# Patient Record
Sex: FEMALE | Race: WHITE | HISPANIC OR LATINO | Employment: OTHER | URBAN - NONMETROPOLITAN AREA
[De-identification: names, ages, dates, MRNs, and addresses within clinical notes are randomized per-mention and may not be internally consistent; named-entity substitution may affect disease eponyms.]

---

## 2017-02-08 ENCOUNTER — OFFICE VISIT (OUTPATIENT)
Dept: FAMILY MEDICINE | Facility: OTHER | Age: 57
End: 2017-02-08
Attending: PHYSICIAN ASSISTANT
Payer: COMMERCIAL

## 2017-02-08 VITALS
HEART RATE: 69 BPM | WEIGHT: 160 LBS | TEMPERATURE: 97.8 F | OXYGEN SATURATION: 98 % | DIASTOLIC BLOOD PRESSURE: 82 MMHG | BODY MASS INDEX: 29.44 KG/M2 | SYSTOLIC BLOOD PRESSURE: 126 MMHG | HEIGHT: 62 IN

## 2017-02-08 DIAGNOSIS — R74.8 ELEVATED AMYLASE: ICD-10-CM

## 2017-02-08 DIAGNOSIS — Z12.31 ENCOUNTER FOR SCREENING MAMMOGRAM FOR BREAST CANCER: ICD-10-CM

## 2017-02-08 DIAGNOSIS — Z90.49 H/O COLECTOMY: ICD-10-CM

## 2017-02-08 DIAGNOSIS — Z76.89 ESTABLISHING CARE WITH NEW DOCTOR, ENCOUNTER FOR: Primary | ICD-10-CM

## 2017-02-08 DIAGNOSIS — R10.13 ABDOMINAL PAIN, EPIGASTRIC: ICD-10-CM

## 2017-02-08 PROBLEM — K57.30 DIVERTICULOSIS OF LARGE INTESTINE: Status: ACTIVE | Noted: 2017-02-08

## 2017-02-08 LAB
ALBUMIN SERPL-MCNC: 3.9 G/DL (ref 3.4–5)
ALP SERPL-CCNC: 104 U/L (ref 40–150)
ALT SERPL W P-5'-P-CCNC: 44 U/L (ref 0–50)
AMYLASE SERPL-CCNC: 133 U/L (ref 30–110)
ANION GAP SERPL CALCULATED.3IONS-SCNC: 5 MMOL/L (ref 3–14)
AST SERPL W P-5'-P-CCNC: 27 U/L (ref 0–45)
BASOPHILS # BLD AUTO: 0 10E9/L (ref 0–0.2)
BASOPHILS NFR BLD AUTO: 0.4 %
BILIRUB SERPL-MCNC: 1 MG/DL (ref 0.2–1.3)
BUN SERPL-MCNC: 8 MG/DL (ref 7–30)
CALCIUM SERPL-MCNC: 9 MG/DL (ref 8.5–10.1)
CHLORIDE SERPL-SCNC: 108 MMOL/L (ref 94–109)
CO2 SERPL-SCNC: 28 MMOL/L (ref 20–32)
CREAT SERPL-MCNC: 0.73 MG/DL (ref 0.52–1.04)
CRP SERPL-MCNC: 4.6 MG/L (ref 0–8)
DIFFERENTIAL METHOD BLD: ABNORMAL
EOSINOPHIL # BLD AUTO: 0.1 10E9/L (ref 0–0.7)
EOSINOPHIL NFR BLD AUTO: 0.9 %
ERYTHROCYTE [DISTWIDTH] IN BLOOD BY AUTOMATED COUNT: 12.6 % (ref 10–15)
ERYTHROCYTE [SEDIMENTATION RATE] IN BLOOD BY WESTERGREN METHOD: 9 MM/H (ref 0–30)
GFR SERPL CREATININE-BSD FRML MDRD: 83 ML/MIN/1.7M2
GLUCOSE SERPL-MCNC: 107 MG/DL (ref 70–99)
HCT VFR BLD AUTO: 45.5 % (ref 35–47)
HGB BLD-MCNC: 15.8 G/DL (ref 11.7–15.7)
IMM GRANULOCYTES # BLD: 0 10E9/L (ref 0–0.4)
IMM GRANULOCYTES NFR BLD: 0.3 %
LIPASE SERPL-CCNC: 373 U/L (ref 73–393)
LYMPHOCYTES # BLD AUTO: 2.6 10E9/L (ref 0.8–5.3)
LYMPHOCYTES NFR BLD AUTO: 33.6 %
MCH RBC QN AUTO: 31.3 PG (ref 26.5–33)
MCHC RBC AUTO-ENTMCNC: 34.7 G/DL (ref 31.5–36.5)
MCV RBC AUTO: 90 FL (ref 78–100)
MONOCYTES # BLD AUTO: 0.6 10E9/L (ref 0–1.3)
MONOCYTES NFR BLD AUTO: 8 %
NEUTROPHILS # BLD AUTO: 4.4 10E9/L (ref 1.6–8.3)
NEUTROPHILS NFR BLD AUTO: 56.8 %
NRBC # BLD AUTO: 0 10*3/UL
NRBC BLD AUTO-RTO: 0 /100
PLATELET # BLD AUTO: 218 10E9/L (ref 150–450)
POTASSIUM SERPL-SCNC: 3.9 MMOL/L (ref 3.4–5.3)
PROT SERPL-MCNC: 7.9 G/DL (ref 6.8–8.8)
RBC # BLD AUTO: 5.04 10E12/L (ref 3.8–5.2)
SODIUM SERPL-SCNC: 141 MMOL/L (ref 133–144)
WBC # BLD AUTO: 7.8 10E9/L (ref 4–11)

## 2017-02-08 PROCEDURE — 86140 C-REACTIVE PROTEIN: CPT | Performed by: PHYSICIAN ASSISTANT

## 2017-02-08 PROCEDURE — 82150 ASSAY OF AMYLASE: CPT | Performed by: PHYSICIAN ASSISTANT

## 2017-02-08 PROCEDURE — 85652 RBC SED RATE AUTOMATED: CPT | Performed by: PHYSICIAN ASSISTANT

## 2017-02-08 PROCEDURE — 83690 ASSAY OF LIPASE: CPT | Performed by: PHYSICIAN ASSISTANT

## 2017-02-08 PROCEDURE — 99203 OFFICE O/P NEW LOW 30 MIN: CPT | Performed by: PHYSICIAN ASSISTANT

## 2017-02-08 PROCEDURE — 80053 COMPREHEN METABOLIC PANEL: CPT | Performed by: PHYSICIAN ASSISTANT

## 2017-02-08 PROCEDURE — 36415 COLL VENOUS BLD VENIPUNCTURE: CPT | Performed by: PHYSICIAN ASSISTANT

## 2017-02-08 PROCEDURE — 85025 COMPLETE CBC W/AUTO DIFF WBC: CPT | Performed by: PHYSICIAN ASSISTANT

## 2017-02-08 RX ORDER — HYOSCYAMINE SULFATE 0.125 MG
0.125-0.25 TABLET ORAL EVERY 4 HOURS PRN
Qty: 40 TABLET | Refills: 1 | Status: SHIPPED | OUTPATIENT
Start: 2017-02-08 | End: 2018-09-11

## 2017-02-08 RX ORDER — GRAPE SEED OIL 100 %
1 OIL (ML) MISCELLANEOUS DAILY
COMMUNITY
End: 2021-06-21

## 2017-02-08 ASSESSMENT — ANXIETY QUESTIONNAIRES
1. FEELING NERVOUS, ANXIOUS, OR ON EDGE: SEVERAL DAYS
5. BEING SO RESTLESS THAT IT IS HARD TO SIT STILL: NOT AT ALL
IF YOU CHECKED OFF ANY PROBLEMS ON THIS QUESTIONNAIRE, HOW DIFFICULT HAVE THESE PROBLEMS MADE IT FOR YOU TO DO YOUR WORK, TAKE CARE OF THINGS AT HOME, OR GET ALONG WITH OTHER PEOPLE: NOT DIFFICULT AT ALL
6. BECOMING EASILY ANNOYED OR IRRITABLE: SEVERAL DAYS
2. NOT BEING ABLE TO STOP OR CONTROL WORRYING: SEVERAL DAYS
3. WORRYING TOO MUCH ABOUT DIFFERENT THINGS: SEVERAL DAYS
7. FEELING AFRAID AS IF SOMETHING AWFUL MIGHT HAPPEN: SEVERAL DAYS
GAD7 TOTAL SCORE: 5

## 2017-02-08 ASSESSMENT — PATIENT HEALTH QUESTIONNAIRE - PHQ9: 5. POOR APPETITE OR OVEREATING: NOT AT ALL

## 2017-02-08 ASSESSMENT — PAIN SCALES - GENERAL: PAINLEVEL: NO PAIN (0)

## 2017-02-08 NOTE — PATIENT INSTRUCTIONS
Thank you for choosing [facility].   I appreciate the opportunity to serve you and look forward to supporting your healthcare needs in the future. Please contact me with any questions or concerns that you may have.    Sincerely,    Celeste Villa RN, PA-C

## 2017-02-08 NOTE — MR AVS SNAPSHOT
After Visit Summary   2/8/2017    Annamaria Baer    MRN: [id number]           Patient Information     Date Of Birth          1960        Visit Information        Provider Department      2/8/2017 9:00 AM Celeste Villa PA [facility]        Today's Diagnoses     Establishing care with new doctor, encounter for    -  1     Abdominal pain, epigastric         H/O colectomy         Encounter for screening mammogram for breast cancer           Care Instructions    Thank you for choosing [facility].   I appreciate the opportunity to serve you and look forward to supporting your healthcare needs in the future. Please contact me with any questions or concerns that you may have.    Sincerely,    Celeste Villa RN, PA-C           Follow-ups after your visit        Additional Services     GASTROENTEROLOGY ADULT REF CONSULT ONLY       Preferred Location: Dr. Franco or Dr. Eli.  Willing to go to [facility].  We need to get records on her 2 colon resections to him.       Please be aware that coverage of these services is subject to the terms and limitations of your health insurance plan.  Call member services at your health plan with any benefit or coverage questions.  Any procedures must be performed at a [place] facility OR coordinated by your clinic's referral office.    Please bring the following with you to your appointment:    (1) Any X-Rays, CTs or MRIs which have been performed.  Contact the facility where they were done to arrange for  prior to your scheduled appointment.    (2) List of current medications   (3) This referral request   (4) Any documents/labs given to you for this referral                  Who to contact     If you have questions or need follow up information about today's clinic visit or your schedule please contact [facility] directly at [phone].  Normal or non-critical lab and imaging results will be communicated to you by  "MyChart, letter or phone within 4 business days after the clinic has received the results. If you do not hear from us within 7 days, please contact the clinic through [employer] or phone. If you have a critical or abnormal lab result, we will notify you by phone as soon as possible.  Submit refill requests through [employer] or call your pharmacy and they will forward the refill request to us. Please allow 3 business days for your refill to be completed.          Additional Information About Your Visit        [employer]" Information     [employer] lets you send messages to your doctor, view your test results, renew your prescriptions, schedule appointments and more. To sign up, go to [email or web address] . Click on \"Log in\" on the left side of the screen, which will take you to the Welcome page. Then click on \"Sign up Now\" on the right side of the page.     You will be asked to enter the access code listed below, as well as some personal information. Please follow the directions to create your username and password.     Your access code is: [email or web address]  Expires: 2017  9:44 AM     Your access code will  in 90 days. If you need help or a new code, please call your [place] clinic or [phone].        Care EveryWhere ID     This is your Care EveryWhere ID. This could be used by other organizations to access your [place] medical records  [id number]        Your Vitals Were     Pulse Temperature Height BMI (Body Mass Index) Pulse Oximetry       69 97.8  F (36.6  C) (Tympanic) 5' 2\" (1.575 m) 29.26 kg/m2 98%        Blood Pressure from Last 3 Encounters:   17 126/82    Weight from Last 3 Encounters:   17 160 lb (72.576 kg)              We Performed the Following     Amylase     CBC with platelets and differential     Comprehensive metabolic panel     CRP, inflammation     ESR: Erythrocyte sedimentation rate     GASTROENTEROLOGY ADULT REF CONSULT ONLY     Lipase     MA Screening Digital Bilateral        "   Today's Medication Changes          These changes are accurate as of: 2/8/17  9:44 AM.  If you have any questions, ask your nurse or doctor.               Start taking these medicines.        Dose/Directions    hyoscyamine 0.125 MG tablet   Commonly known as:  ANASPAZ/LEVSIN   Used for:  Abdominal pain, epigastric, H/O colectomy   Started by:  Celeste Villa PA        Dose:  0.125-0.25 mg   Take 1-2 tablets (125-250 mcg) by mouth every 4 hours as needed for cramping   Quantity:  40 tablet   Refills:  1            Where to get your medicines      These medications were sent to [facility] - [facility] - [address]  [address] [place] [address]     Phone:  [phone]    - hyoscyamine 0.125 MG tablet             Primary Care Provider    None Specified       No primary provider on file.        Thank you!     [facility] you [facility] [place]  [facility] your [facility]. Our goal is always to provide you with excellent care. Hearing back from our patients is one way we can continue to improve our services. Please take a few minutes to complete the written survey that you may receive in the mail after your visit with us. Thank you!             Your Updated Medication List - Protect others around you: Learn how to safely use, store and throw away your medicines at www.disposemymeds.org.          This list is accurate as of: 2/8/17  9:44 AM.  Always use your most recent med list.                   Brand Name Dispense Instructions for use    FIBER PO      Take 6 tablets by mouth daily       Grape Seed Oil      1 capsule daily       hyoscyamine 0.125 MG tablet    ANASPAZ/LEVSIN    40 tablet    Take 1-2 tablets (125-250 mcg) by mouth every 4 hours as needed for cramping       IBUPROFEN -38 MG Tabs   Generic drug:  Ibuprofen-Diphenhydramine Cit      Take 1 tablet by mouth nightly as needed       METAMUCIL 0.52 G capsule   Generic drug:  psyllium          minoxidil 2 % external solution     ROGAINE         * UNABLE TO FIND      MEDICATION NAME: Consuelo REYES       * UNABLE TO FIND      MEDICATION NAME: Tumeric       * Notice:  This list has 2 medication(s) that are the same as other medications prescribed for you. Read the directions carefully, and ask your doctor or other care provider to review them with you.

## 2017-02-08 NOTE — NURSING NOTE
"Chief Complaint   Patient presents with     Establish Care     Need PCP     GI Problem     *_* Health Care Directive *_*     declined        Initial /82 mmHg  Pulse 69  Temp(Src) 97.8  F (36.6  C) (Tympanic)  Ht 5' 2\" (1.575 m)  Wt 160 lb (72.576 kg)  BMI 29.26 kg/m2  SpO2 98% Estimated body mass index is 29.26 kg/(m^2) as calculated from the following:    Height as of this encounter: 5' 2\" (1.575 m).    Weight as of this encounter: 160 lb (72.576 kg).  Medication Reconciliation: complete     EARNESTINE MOYA      "

## 2017-02-08 NOTE — PROGRESS NOTES
SUBJECTIVE:                                                    Annamaria Baer is a 56 year old female who presents to clinic today for the following health issues:      Gastrointestinal symptoms    Duration: 3 years  Description:          History of 2 colon resections also has diverticulitis  Intensity:  moderate  Accompanying signs and symptoms:  nausea, diarrhea, problems swallowing -  and abdominal pain  History  Previous {similar problem: YES  Previous evaluation:  colonoscopy and Colon resection  Aggravating factors: unknown   Alleviating factors: nothing   Other Therapies tried: metamucil. See Dr[facility]ar Colin [facility]    Had a pap smear 2 years.  Not had mammogram in 2 years.          Problem list and histories reviewed & adjusted, as indicated.  Additional history: as documented    Patient Active Problem List   Diagnosis     History of colectomy     Enthesopathy of hip region     Diverticulosis of large intestine     Past Surgical History   Procedure Laterality Date     Resection abdominal perineal       Colonoscopy       Gallbladder surgery        section       X2     Ankle surgery Left        Social History   Substance Use Topics     Smoking status: Former Smoker     Smokeless tobacco: Not on file     Alcohol Use: Not on file     Family History   Problem Relation Age of Onset     CEREBROVASCULAR DISEASE Mother      Cirrhosis Father      Bone Cancer Maternal Grandmother      DIABETES Paternal Grandmother      CANCER Paternal Grandfather          Current Outpatient Prescriptions   Medication Sig Dispense Refill     minoxidil (ROGAINE) 2 % external solution        psyllium (METAMUCIL) 0.52 G capsule        FIBER PO Take 6 tablets by mouth daily       UNABLE TO FIND MEDICATION NAME: Slippery ELM       UNABLE TO FIND MEDICATION NAME: Tumeric       Grape Seed OIL 1 capsule daily       Ibuprofen-Diphenhydramine Cit (IBUPROFEN PM) 200-38 MG TABS Take 1 tablet by mouth nightly  "as needed       No Known Allergies  BP Readings from Last 3 Encounters:   02/08/17 126/82    Wt Readings from Last 3 Encounters:   02/08/17 160 lb (72.576 kg)                  Problem list, Medication list, Allergies, and Medical/Social/Surgical histories reviewed in [facility] and updated as appropriate.    ROS:  Constitutional, neuro, ENT, endocrine, pulmonary, cardiac, gastrointestinal, genitourinary, musculoskeletal, integument and psychiatric systems are negative, except as otherwise noted.    OBJECTIVE:                                                    /82 mmHg  Pulse 69  Temp(Src) 97.8  F (36.6  C) (Tympanic)  Ht 5' 2\" (1.575 m)  Wt 160 lb (72.576 kg)  BMI 29.26 kg/m2  SpO2 98%  Body mass index is 29.26 kg/(m^2).  GENERAL APPEARANCE: healthy, alert and no distress  EYES: Eyes grossly normal to inspection, PERRL and conjunctivae and sclerae normal  HENT: ear canals and TM's normal and nose and mouth without ulcers or lesions  NECK: no adenopathy, no asymmetry, masses, or scars and thyroid normal to palpation  RESP: lungs clear to auscultation - no rales, rhonchi or wheezes  CV: regular rates and rhythm, normal S1 S2, no S3 or S4 and no murmur, click or rub  LYMPHATICS: normal ant/post cervical and supraclavicular nodes  ABDOMEN: scars appreciated.   Tenderness and pain,   Bloating.   MS: extremities normal- no gross deformities noted  SKIN: no suspicious lesions or rashes  NEURO: Normal strength and tone, mentation intact and speech normal  PSYCH: mentation appears normal and affect normal/bright    Diagnostic test results:  Diagnostic Test Results:  none      ASSESSMENT/PLAN:                                                    1. Establishing care with new doctor, encounter for  We will accept her in our care.        2. Abdominal pain, epigastric  Long standing history on this.  Has had 2 colon resections.   Has burning pain in her epigastrium.  Starts on the right and goes across the abdomen.   It is " debilitating.  She has done an excellent job at dietary an life changes.  We now need to send her on to Dr. Franco and Dr. Eli.   - hyoscyamine (ANASPAZ/LEVSIN) 0.125 MG tablet; Take 1-2 tablets (125-250 mcg) by mouth every 4 hours as needed for cramping  Dispense: 40 tablet; Refill: 1  - GASTROENTEROLOGY ADULT REF CONSULT ONLY  - CBC with platelets and differential  - Comprehensive metabolic panel  - ESR: Erythrocyte sedimentation rate  - CRP, inflammation  - Amylase  - Lipase    3. H/O colectomy  Had these done Dr. Bill.  He said ? Sphincter of Shantanu dysfunction.  We will get records.  Today it shows her Amylase is high and had gallbladder removed.  We are going to send her to see Gastro.  She has been miserable over 3 yeas.  Never had a problem after gallbladder was out only after her second colectomy.   She has modified her diet greatly and very low fat and low carb and gluten. We are going to check and ultrasound before sending her.   - hyoscyamine (ANASPAZ/LEVSIN) 0.125 MG tablet; Take 1-2 tablets (125-250 mcg) by mouth every 4 hours as needed for cramping  Dispense: 40 tablet; Refill: 1  - GASTROENTEROLOGY ADULT REF CONSULT ONLY  - CBC with platelets and differential  - Comprehensive metabolic panel  - ESR: Erythrocyte sedimentation rate  - CRP, inflammation  - Amylase  - Lipase    4. Encounter for screening mammogram for breast cancer  needing this done. Pap smear is up to date.   - MA Screening Digital Bilateral; Future  - MA Screening Digital Bilateral        See Patient Instructions    JOLENE Turcios  [facility]

## 2017-02-09 ASSESSMENT — PATIENT HEALTH QUESTIONNAIRE - PHQ9: SUM OF ALL RESPONSES TO PHQ QUESTIONS 1-9: 4

## 2017-02-09 ASSESSMENT — ANXIETY QUESTIONNAIRES: GAD7 TOTAL SCORE: 5

## 2017-02-16 ENCOUNTER — HOSPITAL ENCOUNTER (OUTPATIENT)
Dept: ULTRASOUND IMAGING | Facility: HOSPITAL | Age: 57
Discharge: HOME | End: 2017-02-16
Attending: PHYSICIAN ASSISTANT | Admitting: PHYSICIAN ASSISTANT
Payer: COMMERCIAL

## 2017-02-16 DIAGNOSIS — Z12.31 ENCOUNTER FOR SCREENING MAMMOGRAM FOR BREAST CANCER: ICD-10-CM

## 2017-02-16 DIAGNOSIS — Z12.31 VISIT FOR SCREENING MAMMOGRAM: Primary | ICD-10-CM

## 2017-02-16 PROCEDURE — G0202 SCR MAMMO BI INCL CAD: HCPCS | Mod: TC | Performed by: RADIOLOGY

## 2017-02-16 PROCEDURE — 77063 BREAST TOMOSYNTHESIS BI: CPT | Mod: TC | Performed by: RADIOLOGY

## 2017-02-16 PROCEDURE — 76705 ECHO EXAM OF ABDOMEN: CPT | Mod: TC

## 2017-02-16 PROCEDURE — 76700 US EXAM ABDOM COMPLETE: CPT | Mod: TC

## 2018-01-07 ENCOUNTER — HEALTH MAINTENANCE LETTER (OUTPATIENT)
Age: 58
End: 2018-01-07

## 2018-08-01 ENCOUNTER — OFFICE VISIT (OUTPATIENT)
Dept: FAMILY MEDICINE | Facility: OTHER | Age: 58
End: 2018-08-01
Attending: NURSE PRACTITIONER
Payer: COMMERCIAL

## 2018-08-01 VITALS
DIASTOLIC BLOOD PRESSURE: 72 MMHG | TEMPERATURE: 98.5 F | WEIGHT: 166 LBS | HEART RATE: 70 BPM | BODY MASS INDEX: 30.55 KG/M2 | RESPIRATION RATE: 18 BRPM | OXYGEN SATURATION: 98 % | HEIGHT: 62 IN | SYSTOLIC BLOOD PRESSURE: 126 MMHG

## 2018-08-01 DIAGNOSIS — Z87.898 H/O MOTION SICKNESS: Primary | ICD-10-CM

## 2018-08-01 PROCEDURE — 99212 OFFICE O/P EST SF 10 MIN: CPT | Performed by: NURSE PRACTITIONER

## 2018-08-01 RX ORDER — SCOLOPAMINE TRANSDERMAL SYSTEM 1 MG/1
PATCH, EXTENDED RELEASE TRANSDERMAL
Qty: 3 PATCH | Refills: 0 | Status: SHIPPED | OUTPATIENT
Start: 2018-08-01 | End: 2018-09-11

## 2018-08-01 ASSESSMENT — ANXIETY QUESTIONNAIRES
GAD7 TOTAL SCORE: 2
2. NOT BEING ABLE TO STOP OR CONTROL WORRYING: SEVERAL DAYS
5. BEING SO RESTLESS THAT IT IS HARD TO SIT STILL: NOT AT ALL
1. FEELING NERVOUS, ANXIOUS, OR ON EDGE: NOT AT ALL
6. BECOMING EASILY ANNOYED OR IRRITABLE: SEVERAL DAYS
4. TROUBLE RELAXING: NOT AT ALL
3. WORRYING TOO MUCH ABOUT DIFFERENT THINGS: NOT AT ALL
7. FEELING AFRAID AS IF SOMETHING AWFUL MIGHT HAPPEN: NOT AT ALL

## 2018-08-01 ASSESSMENT — PAIN SCALES - GENERAL: PAINLEVEL: NO PAIN (0)

## 2018-08-01 NOTE — PATIENT INSTRUCTIONS
Patient Education    Scopolamine Hydrobromide Ophthalmic drops, solution    Scopolamine Hydrobromide Oral tablet    Scopolamine Transdermal patch - 72 hour  Scopolamine Transdermal patch - 72 hour  What is this medicine?  SCOPOLAMINE (skoe BLOSSOM a meen) is used to prevent nausea and vomiting caused by motion sickness, anesthesia and surgery.  This medicine may be used for other purposes; ask your health care provider or pharmacist if you have questions.  What should I tell my health care provider before I take this medicine?  They need to know if you have any of these conditions:    glaucoma    kidney or liver disease    an unusual or allergic reaction (especially skin allergy) to scopolamine, atropine, other medicines, foods, dyes, or preservatives    pregnant or trying to get pregnant    breast-feeding  How should I use this medicine?  This medicine is for external use only. Follow the directions on the prescription label. One patch contains enough medicine to prevent motion sickness for up to 3 days. Apply the patch at least 4 hours before you need it and only wear one disc at a time. Choose an area behind the ear, that is clean, dry, hairless and free from any cuts or irritation. Wipe the area with a clean dry tissue. Peel off the plastic backing of the skin patch, trying not to touch the adhesive side with your hands. Do not cut the patches. Firmly apply to the area you have chosen, with the metallic side of the patch to the skin and the tan-colored side showing. Once firmly in place, wash your hands well with soap and water. Remove the disc after 3 days, or sooner if you no longer need it. After removing the patch, wash your hands and the area behind your ear thoroughly with soap and water. The patch will still contain some medicine after use. To avoid accidental contact or ingestion by children or pets, fold the used patch in half with the sticky side together and throw away in the trash out of the reach of  children and pets. If you need to use a second patch after you remove the first, place it behind the other ear.  Talk to your pediatrician regarding the use of this medicine in children. Special care may be needed.  Overdosage: If you think you have taken too much of this medicine contact a poison control center or emergency room at once.  NOTE: This medicine is only for you. Do not share this medicine with others.  What if I miss a dose?  Make sure you apply the patch at least 4 hours before you need it. You can apply it the night before traveling.  What may interact with this medicine?    benztropine    bethanechol    medicines for anxiety or sleeping problems like diazepam or temazepam    medicines for hay fever and other allergies    medicines for mental depression    muscle relaxants  This list may not describe all possible interactions. Give your health care provider a list of all the medicines, herbs, non-prescription drugs, or dietary supplements you use. Also tell them if you smoke, drink alcohol, or use illegal drugs. Some items may interact with your medicine.  What should I watch for while using this medicine?  Keep the patch dry, if possible, to prevent it from falling off. Limited contact with water, however, as in bathing or swimming, will not affect the system. If the patch falls off, throw it away and put a new one behind the other ear.  You may get drowsy or dizzy. Do not drive, use machinery, or do anything that needs mental alertness until you know how this medicine affects you. Do not stand or sit up quickly, especially if you are an older patient. This reduces the risk of dizzy or fainting spells. Alcohol may interfere with the effect of this medicine. Avoid alcoholic drinks.  Your mouth may get dry. Chewing sugarless gum or sucking hard candy, and drinking plenty of water may help. Contact your doctor if the problem does not go away or is severe.  This medicine may cause dry eyes and blurred  vision. If you wear contact lenses you may feel some discomfort. Lubricating drops may help. See your eye doctor if the problem does not go away or is severe.  If you are going to have a magnetic resonance imaging (MRI) procedure, tell your MRI technician if you have this patch on your body. It must be removed before a MRI.  What side effects may I notice from receiving this medicine?  Side effects that you should report to your doctor or health care professional as soon as possible:    agitation, nervousness, confusion    blurred vision and other eye problems    dizziness, drowsiness    eye pain or redness in the whites of the eye    hallucinations    pain or difficulty passing urine    skin rash, itching    vomiting  Side effects that usually do not require medical attention (report to your doctor or health care professional if they continue or are bothersome):    headache    nausea  This list may not describe all possible side effects. Call your doctor for medical advice about side effects. You may report side effects to FDA at 6-099-FDA-4435.  Where should I keep my medicine?  Keep out of the reach of children.  Store at room temperature between 20 and 25 degrees C (68 and 77 degrees F). Throw away any unused medicine after the expiration date. When you remove a patch, fold it and throw it in the trash as described above.  NOTE: This sheet is a summary. It may not cover all possible information. If you have questions about this medicine, talk to your doctor, pharmacist, or health care provider.  NOTE:This sheet is a summary. It may not cover all possible information. If you have questions about this medicine, talk to your doctor, pharmacist, or health care provider. Copyright  2016 [employer]

## 2018-08-01 NOTE — MR AVS SNAPSHOT
After Visit Summary   8/1/2018    Annamaria Baer    MRN: [id number]           Patient Information     Date Of Birth          1960        Visit Information        Provider Department      8/1/2018 3:20 PM Geni Mcmahan APRN [facility]        Today's Diagnoses     H/O motion sickness    -  1      Care Instructions      Patient Education    Scopolamine Hydrobromide Ophthalmic drops, solution    Scopolamine Hydrobromide Oral tablet    Scopolamine Transdermal patch - 72 hour  Scopolamine Transdermal patch - 72 hour  What is this medicine?  SCOPOLAMINE (skoe BLOSSOM a meen) is used to prevent nausea and vomiting caused by motion sickness, anesthesia and surgery.  This medicine may be used for other purposes; ask your health care provider or pharmacist if you have questions.  What should I tell my health care provider before I take this medicine?  They need to know if you have any of these conditions:    glaucoma    kidney or liver disease    an unusual or allergic reaction (especially skin allergy) to scopolamine, atropine, other medicines, foods, dyes, or preservatives    pregnant or trying to get pregnant    breast-feeding  How should I use this medicine?  This medicine is for external use only. Follow the directions on the prescription label. One patch contains enough medicine to prevent motion sickness for up to 3 days. Apply the patch at least 4 hours before you need it and only wear one disc at a time. Choose an area behind the ear, that is clean, dry, hairless and free from any cuts or irritation. Wipe the area with a clean dry tissue. Peel off the plastic backing of the skin patch, trying not to touch the adhesive side with your hands. Do not cut the patches. Firmly apply to the area you have chosen, with the metallic side of the patch to the skin and the tan-colored side showing. Once firmly in place, wash your hands well with soap and water. Remove the disc after 3  days, or sooner if you no longer need it. After removing the patch, wash your hands and the area behind your ear thoroughly with soap and water. The patch will still contain some medicine after use. To avoid accidental contact or ingestion by children or pets, fold the used patch in half with the sticky side together and throw away in the trash out of the reach of children and pets. If you need to use a second patch after you remove the first, place it behind the other ear.  Talk to your pediatrician regarding the use of this medicine in children. Special care may be needed.  Overdosage: If you think you have taken too much of this medicine contact a poison control center or emergency room at once.  NOTE: This medicine is only for you. Do not share this medicine with others.  What if I miss a dose?  Make sure you apply the patch at least 4 hours before you need it. You can apply it the night before traveling.  What may interact with this medicine?    benztropine    bethanechol    medicines for anxiety or sleeping problems like diazepam or temazepam    medicines for hay fever and other allergies    medicines for mental depression    muscle relaxants  This list may not describe all possible interactions. Give your health care provider a list of all the medicines, herbs, non-prescription drugs, or dietary supplements you use. Also tell them if you smoke, drink alcohol, or use illegal drugs. Some items may interact with your medicine.  What should I watch for while using this medicine?  Keep the patch dry, if possible, to prevent it from falling off. Limited contact with water, however, as in bathing or swimming, will not affect the system. If the patch falls off, throw it away and put a new one behind the other ear.  You may get drowsy or dizzy. Do not drive, use machinery, or do anything that needs mental alertness until you know how this medicine affects you. Do not stand or sit up quickly, especially if you are an  older patient. This reduces the risk of dizzy or fainting spells. Alcohol may interfere with the effect of this medicine. Avoid alcoholic drinks.  Your mouth may get dry. Chewing sugarless gum or sucking hard candy, and drinking plenty of water may help. Contact your doctor if the problem does not go away or is severe.  This medicine may cause dry eyes and blurred vision. If you wear contact lenses you may feel some discomfort. Lubricating drops may help. See your eye doctor if the problem does not go away or is severe.  If you are going to have a magnetic resonance imaging (MRI) procedure, tell your MRI technician if you have this patch on your body. It must be removed before a MRI.  What side effects may I notice from receiving this medicine?  Side effects that you should report to your doctor or health care professional as soon as possible:    agitation, nervousness, confusion    blurred vision and other eye problems    dizziness, drowsiness    eye pain or redness in the whites of the eye    hallucinations    pain or difficulty passing urine    skin rash, itching    vomiting  Side effects that usually do not require medical attention (report to your doctor or health care professional if they continue or are bothersome):    headache    nausea  This list may not describe all possible side effects. Call your doctor for medical advice about side effects. You may report side effects to FDA at 5-002-FDA-3021.  Where should I keep my medicine?  Keep out of the reach of children.  Store at room temperature between 20 and 25 degrees C (68 and 77 degrees F). Throw away any unused medicine after the expiration date. When you remove a patch, fold it and throw it in the trash as described above.  NOTE: This sheet is a summary. It may not cover all possible information. If you have questions about this medicine, talk to your doctor, pharmacist, or health care provider.  NOTE:This sheet is a summary. It may not cover all  possible information. If you have questions about this medicine, talk to your doctor, pharmacist, or health care provider. Copyright  2016 Gold Standard                Follow-ups after your visit        Follow-up notes from your care team     Return for Physical Exam.      Your next 10 appointments already scheduled     Sep 04, 2018  9:20 AM CDT   (Arrive by 9:05 AM)   PHYSICAL with KENNEDI Mark CNP   [facility] ([facility] )    [place] [address]   [phone]              Who to contact     If you have questions or need follow up information about today's clinic visit or your schedule please contact [facility] directly at [phone].  Normal or non-critical lab and imaging results will be communicated to you by MyChart, letter or phone within 4 business days after the clinic has received the results. If you do not hear from us within 7 days, please contact the clinic through [facility] or phone. If you have a critical or abnormal lab result, we will notify you by phone as soon as possible.  Submit refill requests through [facility] or call your pharmacy and they will forward the refill request to us. Please allow 3 business days for your refill to be completed.          Additional Information About Your Visit        [employer] Information     [facility] gives you secure access to your electronic health record. If you see a primary care provider, you can also send messages to your care team and make appointments. If you have questions, please call your primary care clinic.  If you do not have a primary care provider, please call [phone] and they will assist you.        Care EveryWhere ID     This is your Care EveryWhere ID. This could be used by other organizations to access your [facility] medical records  [id number]        Your Vitals Were     Pulse Temperature Respirations Height Pulse Oximetry BMI (Body Mass Index)    70 98.5  F  "(36.9  C) (Tympanic) 18 5' 2\" (1.575 m) 98% 30.36 kg/m2       Blood Pressure from Last 3 Encounters:   08/01/18 126/72   02/08/17 126/82    Weight from Last 3 Encounters:   08/01/18 166 lb (75.3 kg)   02/08/17 160 lb (72.6 kg)              Today, you had the following     No orders found for display         Today's Medication Changes          These changes are accurate as of 8/1/18  4:14 PM.  If you have any questions, ask your nurse or doctor.               Start taking these medicines.        Dose/Directions    scopolamine 72 hr patch   Commonly known as:  TRANSDERM   Used for:  H/O motion sickness        Apply 1 patch to hairless area behind one ear at least 4 hours before travel.  Remove old patch and change every 3 days (72 hours).   Quantity:  3 patch   Refills:  0            Where to get your medicines      These medications were sent to [place] [address] [place] [address] [address]     Phone:  [phone]     scopolamine 72 hr patch                Primary Care Provider Office Phone # Fax #    JOLENE Taylor [phone]       [address] [place] [address]        Equal Access to Services     JANY GOLD AH: Ernesto aradna hadasho Soomaali, waaxda luqadaha, qaybta kaalmada adeegyada, waxay arturoin [place]. So [facility] [phone].    ATENCIÓN: Si habla español, tiene a ames disposición servicios gratuitos de asistencia lingüística. Llame al [phone].    We comply with applicable federal civil rights laws and [place] laws. We do not discriminate on the basis of race, color, national origin, age, disability, sex, sexual orientation, or gender identity.            Thank you!     Thank you for choosing [facility]  for your care. Our goal is always to provide you with excellent care. Hearing back from our patients is one way we can continue to improve our services. Please take a few minutes to " complete the written survey that you may receive in the mail after your visit with us. Thank you!             Your Updated Medication List - Protect others around you: Learn how to safely use, store and throw away your medicines at www.disposemymeds.org.          This list is accurate as of 8/1/18  4:14 PM.  Always use your most recent med list.                   Brand Name Dispense Instructions for use Diagnosis    FIBER PO      Take 6 tablets by mouth daily        Grape Seed Oil      1 capsule daily        hyoscyamine 0.125 MG tablet    ANASPAZ/LEVSIN    40 tablet    Take 1-2 tablets (125-250 mcg) by mouth every 4 hours as needed for cramping    Abdominal pain, epigastric, H/O colectomy       IBUPROFEN -38 MG Tabs   Generic drug:  Ibuprofen-Diphenhydramine Cit      Take 1 tablet by mouth nightly as needed        METAMUCIL 0.52 g capsule   Generic drug:  psyllium           minoxidil 2 % external solution    ROGAINE          scopolamine 72 hr patch    TRANSDERM    3 patch    Apply 1 patch to hairless area behind one ear at least 4 hours before travel.  Remove old patch and change every 3 days (72 hours).    H/O motion sickness       * UNABLE TO FIND      MEDICATION NAME: Slippery ELM        * UNABLE TO FIND      MEDICATION NAME: Tumeric        * Notice:  This list has 2 medication(s) that are the same as other medications prescribed for you. Read the directions carefully, and ask your doctor or other care provider to review them with you.

## 2018-08-01 NOTE — PROGRESS NOTES
"Chief Complaint   Patient presents with     Annual Visit       Initial /72  Pulse 70  Temp 98.5  F (36.9  C) (Tympanic)  Resp 18  Ht 5' 2\" (1.575 m)  Wt 166 lb (75.3 kg)  SpO2 98%  BMI 30.36 kg/m2 Estimated body mass index is 30.36 kg/(m^2) as calculated from the following:    Height as of this encounter: 5' 2\" (1.575 m).    Weight as of this encounter: 166 lb (75.3 kg).  Medication Reconciliation: complete    Summer Brown LPN    "

## 2018-08-01 NOTE — PROGRESS NOTES
SUBJECTIVE:   Annamaria Baer is a 57 year old female who presents to clinic today for the following health issues:      Gastrointestinal symptoms      Duration: started with dx of diverticulitis surgeries 2009 and     Description:           Nausea, GB is out, left side, bloating    Intensity:  mild    Accompanying signs and symptoms:  none    History  Previous {similar problem: YES  Previous evaluation:  colonoscopy and ultrasound, had a referral but was never called to set up appt.    Aggravating factors: none and certain foods - carbs and proteins together    Alleviating factors: lifestyle change including diet and portions     Other Therapies tried: None    Pain and nausea comes with certain food.  She has eliminated milk, cottage cheese, peanut butter, yogurt and sour cream. She stated has helped relieve most of her symptoms.     She was never contacted by GI with [facility] when referral was made, but does not want to go at this time. She may consider when she is due for his next colonoscopy due in      Annual visit      Duration: going on a trip to [place]     Description (location/character/radiation): motion sicknkess    Intensity:  moderate    Accompanying signs and symptoms: N&V    History (similar episodes/previous evaluation): None    Precipitating or alleviating factors: requesting patches sent to [facility].    Therapies tried and outcome: None           Problem list and histories reviewed & adjusted, as indicated.  Additional history: as documented    Patient Active Problem List   Diagnosis     History of colectomy     Enthesopathy of hip region     Diverticulosis of large intestine     Past Surgical History:   Procedure Laterality Date     ANKLE SURGERY Left       SECTION      X2     COLONOSCOPY  2014    mild Diverticulosis - Dr. Chowdhury     COLONOSCOPY  2009    lucas yen  [facility]     COLONOSCOPY - HIM SCAN  2015     "Hyperplastic Polyp - Dr. Chowdhury     GALLBLADDER SURGERY       RESECTION ABDOMINAL PERINEAL         Social History   Substance Use Topics     Smoking status: Former Smoker     Smokeless tobacco: Not on file     Alcohol use Not on file     Family History   Problem Relation Age of Onset     Cerebrovascular Disease Mother      Cirrhosis Father      Bone Cancer Maternal Grandmother      Diabetes Paternal Grandmother      Cancer Paternal Grandfather          Current Outpatient Prescriptions   Medication Sig Dispense Refill     FIBER PO Take 6 tablets by mouth daily       Grape Seed OIL 1 capsule daily       hyoscyamine (ANASPAZ/LEVSIN) 0.125 MG tablet Take 1-2 tablets (125-250 mcg) by mouth every 4 hours as needed for cramping 40 tablet 1     Ibuprofen-Diphenhydramine Cit (IBUPROFEN PM) 200-38 MG TABS Take 1 tablet by mouth nightly as needed       minoxidil (ROGAINE) 2 % external solution        psyllium (METAMUCIL) 0.52 G capsule        UNABLE TO FIND MEDICATION NAME: Slippery ELM       UNABLE TO FIND MEDICATION NAME: Tumeric       Allergies   Allergen Reactions     Minocycline Other (See Comments) and Itching     Sulfa Drugs Hives       Reviewed and updated as needed this visit by clinical staff       Reviewed and updated as needed this visit by Provider         ROS:  CONSTITUTIONAL: NEGATIVE for fever, chills, change in weight  INTEGUMENTARY/SKIN: NEGATIVE for worrisome rashes, moles or lesions  RESP: NEGATIVE for significant cough or SOB  CV: NEGATIVE for chest pain, palpitations or peripheral edema    OBJECTIVE:     /72  Pulse 70  Temp 98.5  F (36.9  C) (Tympanic)  Resp 18  Ht 5' 2\" (1.575 m)  Wt 166 lb (75.3 kg)  SpO2 98%  BMI 30.36 kg/m2  Body mass index is 30.36 kg/(m^2).   GENERAL: healthy, alert and no distress  RESP: lungs clear to auscultation - no rales, rhonchi or wheezes  CV: regular rate and rhythm, normal S1 S2, no S3 or S4, no murmur, click or rub, no peripheral edema and peripheral pulses " strong    Diagnostic Test Results:  none     ASSESSMENT/PLAN:     1. H/O motion sickness  Annamaria will [place].  Placed order for scopolamine patches to prevent nausea. Discussed s/sx of patches.   - scopolamine (TRANSDERM) 72 hr patch; Apply 1 patch to hairless area behind one ear at least 4 hours before travel.  Remove old patch and change every 3 days (72 hours).  Dispense: 3 patch; Refill: 0    Annamaria is due for a physical with pap, mammo and fasting labs. Plan to set up an appointment for when she returns.    See Patient Instructions    KENNEDI Finch [facility] Center[facility]

## 2018-08-02 ASSESSMENT — ANXIETY QUESTIONNAIRES: GAD7 TOTAL SCORE: 2

## 2018-08-02 ASSESSMENT — PATIENT HEALTH QUESTIONNAIRE - PHQ9: SUM OF ALL RESPONSES TO PHQ QUESTIONS 1-9: 5

## 2018-08-03 ENCOUNTER — TELEPHONE (OUTPATIENT)
Dept: FAMILY MEDICINE | Facility: OTHER | Age: 58
End: 2018-08-03

## 2018-08-03 DIAGNOSIS — T75.3XXA MOTION SICKNESS, INITIAL ENCOUNTER: Primary | ICD-10-CM

## 2018-08-03 RX ORDER — SCOLOPAMINE TRANSDERMAL SYSTEM 1 MG/1
1 PATCH, EXTENDED RELEASE TRANSDERMAL
Qty: 4 PATCH | Refills: 0 | Status: SHIPPED | OUTPATIENT
Start: 2018-08-03 | End: 2018-09-11

## 2018-08-03 NOTE — TELEPHONE ENCOUNTER
Pharmacy fax states the transderm scope 1 mg patches are unavailable at this or other pharmacies. Requesting the transderm 1.5 mg patches.

## 2018-09-10 NOTE — PATIENT INSTRUCTIONS
HOW TO QUIT SMOKING  Smoking is one of the hardest habits to break. About half of all those who have ever smoked have been able to quit, and most of those (about 70%) who still smoke want to quit. Here are some of the best ways to stop smoking.     KEEP TRYING:  It takes most smokers about 8 tries before they are finally able to fully quit. So, the more often you try and fail, the better your chance of quitting the next time! So, don't give up!    GO COLD TURKEY:  Most ex-smokers quit cold turkey. Trying to cut back gradually doesn't seem to work as well, perhaps because it continues the smoking habit. Also, it is possible to fool yourself by inhaling more while smoking fewer cigarettes. This results in the same amount of nicotine in your body!    GET SUPPORT:  Support programs can make an important difference, especially for the heavy smoker. These groups offer lectures, methods to change your behavior and peer support. Call the free national Quitline for more information. 800-QUIT-NOW (416-585-9797). Low-cost or free programs are offered by many hospitals, local chapters of the American Lung Association (495-546-5335) and the American Cancer Society (209-817-5664). Support at home is important too. Non-smokers can help by offering praise and encouragement. If the smoker fails to quit, encourage them to try again!    OVER-THE-COUNTER MEDICINES:  For those who can't quit on their own, Nicotine Replacement Therapy (NRT) may make quitting much easier. Certain aids such as the nicotine patch, gum and lozenge are available without a prescription. However, it is best to use these under the guidance of your doctor. The skin patch provides a steady supply of nicotine to the body. Nicotine gum and lozenge gives temporary bursts of low levels of nicotine. Both methods take the edge off the craving for cigarettes. WARNING: If you feel symptoms of nicotine overdose, such as nausea, vomiting, dizziness, weakness, or fast  heartbeat, stop using these and see your doctor.    PRESCRIPTION MEDICINES:  After evaluating your smoking patterns and prior attempts at quitting, your doctor may offer a prescription medicine such as bupropion (Zyban, Wellbutrin), varenicline (Chantix, Champix), a niocotine inhaler or nasal spray. Each has its unique advantage and side effects which your doctor can review with you.    HEALTH BENEFITS OF QUITTING:  The benefits of quitting start right away and keep improving the longer you go without smokin minutes: blood pressure and pulse return to normal  8 hours: oxygen levels return to normal  2 days: ability to smell and taste begins to improve as damaged nerves start to regrow  2-3 weeks: circulation and lung function improves  1-9 months: decreased cough, congestion and shortness of breath; less tired  1 year: risk of heart attack decreases by half  5 years: risk of lung cancer decreases by half; risk of stroke becomes the same as a non-smoker  For information about how to quit smoking, visit the following links:  National Cancer [place] ,   Clearing the Air, Quit Smoking Today   - an online booklet. http://www.smokefree.gov/pubs/clearing_the_air.pdf  Smokefree.gov http://smokefree.gov/  QuitNet http://www.quitnet.com/    8763-7858 Verónica Robert, [address] [place] [address], [place] [address]. All rights reserved. This information is not intended as a substitute for professional medical care. Always follow your healthcare professional's instructions.    The Benefits of Living Smoke Free  What do you want to gain from quitting? Check off some reasons to quit.  Health Benefits  ___ Reduce my risk of lung cancer, heart disease, chronic lung disease  ___ Have fewer wrinkles and softer skin  ___ Improve my sense of taste and smell  ___ For pregnant women--reduce the risk of having a miscarriage, stillbirth, premature birth, or low-birth-weight baby  Personal Benefits  ___ Feel more in control of my  life  ___ Have better-smelling hair, breath, clothes, home, and car  ___ Save time by not having to take smoke breaks, buy cigarettes, or hunt for a light  ___ Have whiter teeth  Family Benefits  ___ Reduce my children s respiratory tract infections  ___ Set a good example for my children  ___ Reduce my family s cancer risk  Financial Benefits  ___ Save hundreds of dollars each year that would be spent on cigarettes  ___ Save money on medical bills  ___ Save on life, health, and car insurance premiums    Those Dollars Add Up!  Cigarettes are expensive, and getting more expensive all the time. Do you realize how much money you are spending on cigarettes per year? What is the average amount you spend on a pack of cigarettes? What is the average number of packs that you smoke per day? Using your answers to these questions, fill in this formula to help you find out:  ($ _____ per pack) ×  ( _____ number of packs per day) × (365 days) =  $ _____ yearly cost of smoking  Besides tobacco, there are other costs, including extra cleaning bills and replacement costs for clothing and furniture; medical expenses for smoking-related illnesses; and higher health, life, and car insurance premiums.    Cigars and Pipes Count Too!  Cigars and pipes are also dangerous. So are smokeless (chewing) tobacco and snuff. All of these products contain nicotine, a highly addictive substance that has harmful effects on your body. Quitting smoking means giving up all tobacco products.      1794-8408 [address], [place] [address]. All rights reserved. This information is not intended as a substitute for professional medical care. Always follow your healthcare professional's instructions.  Preventive Health Recommendations  Female Ages 50 - 64    Yearly exam: See your health care provider every year in order to  o Review health changes.   o Discuss preventive care.    o Review your medicines if your doctor has  prescribed any.      Get a Pap test every three years (unless you have an abnormal result and your provider advises testing more often).    If you get Pap tests with HPV test, you only need to test every 5 years, unless you have an abnormal result.     You do not need a Pap test if your uterus was removed (hysterectomy) and you have not had cancer.    You should be tested each year for STDs (sexually transmitted diseases) if you're at risk.     Have a mammogram every 1 to 2 years.    Have a colonoscopy at age 50, or have a yearly FIT test (stool test). These exams screen for colon cancer.      Have a cholesterol test every 5 years, or more often if advised.    Have a diabetes test (fasting glucose) every three years. If you are at risk for diabetes, you should have this test more often.     If you are at risk for osteoporosis (brittle bone disease), think about having a bone density scan (DEXA).    Shots: Get a flu shot each year. Get a tetanus shot every 10 years.    Nutrition:     Eat at least 5 servings of fruits and vegetables each day.    Eat whole-grain bread, whole-wheat pasta and brown rice instead of white grains and rice.    Get adequate Calcium and Vitamin D.     Lifestyle    Exercise at least 150 minutes a week (30 minutes a day, 5 days a week). This will help you control your weight and prevent disease.    Limit alcohol to one drink per day.    No smoking.     Wear sunscreen to prevent skin cancer.     See your dentist every six months for an exam and cleaning.    See your eye doctor every 1 to 2 years.

## 2018-09-10 NOTE — PROGRESS NOTES
SUBJECTIVE:   CC: Annamaria Baer is an 57 year old woman who presents for preventive health visit.     Healthy Habits:    Do you get at least three servings of calcium containing foods daily (dairy, green leafy vegetables, etc.)? yes    Amount of exercise or daily activities, outside of work: 5 days a week one hour    Problems taking medications regularly No    Medication side effects: No    Have you had an eye exam in the past two years? yes    Do you see a dentist twice per year? yes    Do you have sleep apnea, excessive snoring or daytime drowsiness?yes, snores          Today's PHQ-2 Score: No flowsheet data found.    PHQ-9 SCORE 2017   Total Score 4 5 3     ARABELLA-7 SCORE 2017   Total Score 5 2 0       Abuse: Current or Past(Physical, Sexual or Emotional)- No  Do you feel safe in your environment - Yes    Social History   Substance Use Topics     Smoking status: Former Smoker     Smokeless tobacco: Not on file     Alcohol use Not on file     If you drink alcohol do you typically have >3 drinks per day or >7 drinks per week? No                     Reviewed orders with patient.  Reviewed health maintenance and updated orders accordingly - Yes  BP Readings from Last 3 Encounters:   18 128/88   18 126/72   17 126/82    Wt Readings from Last 3 Encounters:   18 165 lb (74.8 kg)   18 166 lb (75.3 kg)   17 160 lb (72.6 kg)                  Patient Active Problem List   Diagnosis     History of colectomy     Enthesopathy of hip region     Diverticulosis of large intestine     Past Surgical History:   Procedure Laterality Date     ANKLE SURGERY Left       SECTION      X2     COLONOSCOPY  2014    mild Diverticulosis - Dr. Chowdhury     COLONOSCOPY  2009    lucas yen  [facility]     COLONOSCOPY - HIM SCAN  2015    Hyperplastic Polyp - Dr. Chowdhury     GALLBLADDER SURGERY       RESECTION ABDOMINAL  "PERINEAL         Social History   Substance Use Topics     Smoking status: Former Smoker     Packs/day: 1.50     Years: 24.00     Types: Cigarettes     Start date: 1/1/1976     Quit date: 1/3/2000     Smokeless tobacco: Never Used     Alcohol use 1.2 oz/week     1 Standard drinks or equivalent, 1 Glasses of wine per week     Family History   Problem Relation Age of Onset     Cerebrovascular Disease Mother      Cirrhosis Father      Bone Cancer Maternal Grandmother      Diabetes Paternal Grandmother      Cancer Paternal Grandfather          Current Outpatient Prescriptions   Medication Sig Dispense Refill     FIBER PO Take 6 tablets by mouth daily       Grape Seed OIL 1 capsule daily       Ibuprofen-Diphenhydramine Cit (IBUPROFEN PM) 200-38 MG TABS Take 1 tablet by mouth nightly as needed       psyllium (METAMUCIL) 0.52 G capsule        UNABLE TO FIND MEDICATION NAME: Slippery ELM       UNABLE TO FIND MEDICATION NAME: Tumeric       Allergies   Allergen Reactions     Minocycline Other (See Comments) and Itching     Sulfa Drugs Hives       Patient over age 50, mutual decision to screen reflected in health maintenance.    Pertinent mammograms are reviewed under the imaging tab.  History of abnormal Pap smear: NO - age 30- 65 PAP every 3 years recommended     Reviewed and updated as needed this visit by clinical staff        Chief Complaint   Patient presents with     Physical       Initial /88  Pulse 72  Temp 98.8  F (37.1  C) (Tympanic)  Resp 18  Ht 5' 2\" (1.575 m)  Wt 165 lb (74.8 kg)  SpO2 99%  BMI 30.18 kg/m2 Estimated body mass index is 30.18 kg/(m^2) as calculated from the following:    Height as of this encounter: 5' 2\" (1.575 m).    Weight as of this encounter: 165 lb (74.8 kg).  Medication Reconciliation: complete    Summer Brown LPN      Reviewed and updated as needed this visit by Provider            ROS:  CONSTITUTIONAL: NEGATIVE for fever, chills, change in weight  INTEGUMENTARY/SKIN: " NEGATIVE for worrisome rashes, moles or lesions  EYES: NEGATIVE for vision changes or irritation  ENT: NEGATIVE for ear, mouth and throat problems  RESP: NEGATIVE for significant cough or SOB  BREAST: NEGATIVE for masses, tenderness or discharge  CV: rare limited chest pain across entire chest possible related to acid reflux worse with full stop  GI: gas or bloating and heartburn or reflux  : NEGATIVE for unusual urinary or vaginal symptoms. No vaginal bleeding.  MUSCULOSKELETAL:chronic back pain and tightness   NEURO: NEGATIVE for weakness, dizziness or paresthesias  ENDOCRINE: NEGATIVE for temperature intolerance, skin/hair changes  HEME/ALLERGY/IMMUNE: NEGATIVE for bleeding problems  PSYCHIATRIC: NEGATIVE for changes in mood or affect     OBJECTIVE:   There were no vitals taken for this visit.  EXAM:  GENERAL: healthy, alert and no distress  EYES: Eyes grossly normal to inspection, PERRL and conjunctivae and sclerae normal  HENT: ear canals and TM's normal, nose and mouth without ulcers or lesions  NECK: no adenopathy, no asymmetry, masses, or scars and thyroid normal to palpation  RESP: lungs clear to auscultation - no rales, rhonchi or wheezes  BREAST: normal without masses, tenderness or nipple discharge and no palpable axillary masses or adenopathy  CV: regular rate and rhythm, normal S1 S2, no S3 or S4, no murmur, click or rub, no peripheral edema and peripheral pulses strong  ABDOMEN: soft, nontender, no hepatosplenomegaly, no masses and bowel sounds normal   (female): normal female external genitalia, normal urethral meatus , vaginal mucosa pink, moist, well rugated, vaginal discharge - moderate and white and normal cervix, adnexae, and uterus without masses.  MS: no gross musculoskeletal defects noted, no edema  SKIN: no suspicious lesions or rashes  NEURO: Normal strength and tone, mentation intact and speech normal  PSYCH: mentation appears normal, affect normal/bright  LYMPH: normal ant/post  cervical, supraclavicular nodes    Diagnostic Test Results:  Results for orders placed or performed in visit on 09/11/18   Lipid Profile (Chol, Trig, HDL, LDL calc)   Result Value Ref Range    Cholesterol 196 <200 mg/dL    Triglycerides 140 <150 mg/dL    HDL Cholesterol 48 (L) >49 mg/dL    LDL Cholesterol Calculated 120 (H) <100 mg/dL    Non HDL Cholesterol 148 (H) <130 mg/dL   CBC with platelets and differential   Result Value Ref Range    WBC 8.3 4.0 - 11.0 10e9/L    RBC Count 5.00 3.8 - 5.2 10e12/L    Hemoglobin 15.9 (H) 11.7 - 15.7 g/dL    Hematocrit 45.1 35.0 - 47.0 %    MCV 90 78 - 100 fl    MCH 31.8 26.5 - 33.0 pg    MCHC 35.3 31.5 - 36.5 g/dL    RDW 12.4 10.0 - 15.0 %    Platelet Count 208 150 - 450 10e9/L    Diff Method Automated Method     % Neutrophils 52.5 %    % Lymphocytes 37.7 %    % Monocytes 8.1 %    % Eosinophils 1.1 %    % Basophils 0.4 %    % Immature Granulocytes 0.2 %    Nucleated RBCs 0 0 /100    Absolute Neutrophil 4.4 1.6 - 8.3 10e9/L    Absolute Lymphocytes 3.1 0.8 - 5.3 10e9/L    Absolute Monocytes 0.7 0.0 - 1.3 10e9/L    Absolute Eosinophils 0.1 0.0 - 0.7 10e9/L    Absolute Basophils 0.0 0.0 - 0.2 10e9/L    Abs Immature Granulocytes 0.0 0 - 0.4 10e9/L    Absolute Nucleated RBC 0.0    Basic metabolic panel  (Ca, Cl, CO2, Creat, Gluc, K, Na, BUN)   Result Value Ref Range    Sodium 141 133 - 144 mmol/L    Potassium 4.0 3.4 - 5.3 mmol/L    Chloride 105 94 - 109 mmol/L    Carbon Dioxide 28 20 - 32 mmol/L    Anion Gap 8 3 - 14 mmol/L    Glucose 106 (H) 70 - 99 mg/dL    Urea Nitrogen 9 7 - 30 mg/dL    Creatinine 0.66 0.52 - 1.04 mg/dL    GFR Estimate >90 >60 mL/min/1.7m2    GFR Estimate If Black >90 >60 mL/min/1.7m2    Calcium 9.4 8.5 - 10.1 mg/dL   Wet prep   Result Value Ref Range    Specimen Description Vagina     Wet Prep WBC'S seen  Rare       Wet Prep No Trichomonas seen     Wet Prep No clue cells seen     Wet Prep No yeast seen      PAP pending    mammo ordered     ASSESSMENT/PLAN:   1.  "Tobacco abuse  Smoking cession 2000    2. Tobacco abuse counseling  As above    3. Routine general medical examination at a health care facility  Plan to notify of labs once available. Plan for yearly physical and follow up as needed with any concerns.   - A pap thin layer screen with  HPV - recommended age 30 - 65 years (select HPV order below)  - Lipid Profile (Chol, Trig, HDL, LDL calc)  - CBC with platelets and differential  - Basic metabolic panel  (Ca, Cl, CO2, Creat, Gluc, K, Na, BUN)    4. Encounter for screening mammogram for breast cancer  Due for screening mammogram  - MA SCREENING DIGITAL BILATERAL ([place]); Future    5. Vaginal discharge  Noted increased vaginal discharge during the exam. Plan to notify of wet prep once available.   - Wet prep    COUNSELING:   Reviewed preventive health counseling, as reflected in patient instructions       Regular exercise       Healthy diet/nutrition       Vision screening    BP Readings from Last 1 Encounters:   08/01/18 126/72     Estimated body mass index is 30.36 kg/(m^2) as calculated from the following:    Height as of 8/1/18: 5' 2\" (1.575 m).    Weight as of 8/1/18: 166 lb (75.3 kg).    BP Screening:   Last 3 BP Readings:    BP Readings from Last 3 Encounters:   09/11/18 128/88   08/01/18 126/72   02/08/17 126/82       The following was recommended to the patient:  Re-screen BP within a year and recommended lifestyle modifications  Weight management plan: Discussed healthy diet and exercise guidelines and patient will follow up in 12 months in clinic to re-evaluate.     reports that she has quit smoking. She does not have any smokeless tobacco history on file.      Counseling Resources:  ATP IV Guidelines  Pooled Cohorts Equation Calculator  Breast Cancer Risk Calculator  FRAX Risk Assessment  ICSI Preventive Guidelines  Dietary Guidelines for Americans, 2010  [employer]'s MyPlate  ASA Prophylaxis  Lung CA Screening    Geni Mcmahan, KENNEDI MOLINAParkview [facility] " [facility] [place]

## 2018-09-11 ENCOUNTER — OFFICE VISIT (OUTPATIENT)
Dept: FAMILY MEDICINE | Facility: OTHER | Age: 58
End: 2018-09-11
Attending: NURSE PRACTITIONER
Payer: COMMERCIAL

## 2018-09-11 VITALS
DIASTOLIC BLOOD PRESSURE: 88 MMHG | HEIGHT: 62 IN | BODY MASS INDEX: 30.36 KG/M2 | SYSTOLIC BLOOD PRESSURE: 128 MMHG | WEIGHT: 165 LBS | HEART RATE: 72 BPM | OXYGEN SATURATION: 99 % | RESPIRATION RATE: 18 BRPM | TEMPERATURE: 98.8 F

## 2018-09-11 DIAGNOSIS — Z71.6 TOBACCO ABUSE COUNSELING: ICD-10-CM

## 2018-09-11 DIAGNOSIS — Z72.0 TOBACCO ABUSE: ICD-10-CM

## 2018-09-11 DIAGNOSIS — N89.8 VAGINAL DISCHARGE: ICD-10-CM

## 2018-09-11 DIAGNOSIS — Z00.00 ROUTINE GENERAL MEDICAL EXAMINATION AT A HEALTH CARE FACILITY: Primary | ICD-10-CM

## 2018-09-11 DIAGNOSIS — Z12.31 ENCOUNTER FOR SCREENING MAMMOGRAM FOR BREAST CANCER: ICD-10-CM

## 2018-09-11 LAB
ANION GAP SERPL CALCULATED.3IONS-SCNC: 8 MMOL/L (ref 3–14)
BASOPHILS # BLD AUTO: 0 10E9/L (ref 0–0.2)
BASOPHILS NFR BLD AUTO: 0.4 %
BUN SERPL-MCNC: 9 MG/DL (ref 7–30)
CALCIUM SERPL-MCNC: 9.4 MG/DL (ref 8.5–10.1)
CHLORIDE SERPL-SCNC: 105 MMOL/L (ref 94–109)
CHOLEST SERPL-MCNC: 196 MG/DL
CO2 SERPL-SCNC: 28 MMOL/L (ref 20–32)
CREAT SERPL-MCNC: 0.66 MG/DL (ref 0.52–1.04)
DIFFERENTIAL METHOD BLD: ABNORMAL
EOSINOPHIL # BLD AUTO: 0.1 10E9/L (ref 0–0.7)
EOSINOPHIL NFR BLD AUTO: 1.1 %
ERYTHROCYTE [DISTWIDTH] IN BLOOD BY AUTOMATED COUNT: 12.4 % (ref 10–15)
GFR SERPL CREATININE-BSD FRML MDRD: >90 ML/MIN/1.7M2
GLUCOSE SERPL-MCNC: 106 MG/DL (ref 70–99)
HCT VFR BLD AUTO: 45.1 % (ref 35–47)
HDLC SERPL-MCNC: 48 MG/DL
HGB BLD-MCNC: 15.9 G/DL (ref 11.7–15.7)
IMM GRANULOCYTES # BLD: 0 10E9/L (ref 0–0.4)
IMM GRANULOCYTES NFR BLD: 0.2 %
LDLC SERPL CALC-MCNC: 120 MG/DL
LYMPHOCYTES # BLD AUTO: 3.1 10E9/L (ref 0.8–5.3)
LYMPHOCYTES NFR BLD AUTO: 37.7 %
MCH RBC QN AUTO: 31.8 PG (ref 26.5–33)
MCHC RBC AUTO-ENTMCNC: 35.3 G/DL (ref 31.5–36.5)
MCV RBC AUTO: 90 FL (ref 78–100)
MONOCYTES # BLD AUTO: 0.7 10E9/L (ref 0–1.3)
MONOCYTES NFR BLD AUTO: 8.1 %
NEUTROPHILS # BLD AUTO: 4.4 10E9/L (ref 1.6–8.3)
NEUTROPHILS NFR BLD AUTO: 52.5 %
NONHDLC SERPL-MCNC: 148 MG/DL
NRBC # BLD AUTO: 0 10*3/UL
NRBC BLD AUTO-RTO: 0 /100
PLATELET # BLD AUTO: 208 10E9/L (ref 150–450)
POTASSIUM SERPL-SCNC: 4 MMOL/L (ref 3.4–5.3)
RBC # BLD AUTO: 5 10E12/L (ref 3.8–5.2)
SODIUM SERPL-SCNC: 141 MMOL/L (ref 133–144)
SPECIMEN SOURCE: NORMAL
TRIGL SERPL-MCNC: 140 MG/DL
WBC # BLD AUTO: 8.3 10E9/L (ref 4–11)
WET PREP SPEC: NORMAL

## 2018-09-11 PROCEDURE — G0123 SCREEN CERV/VAG THIN LAYER: HCPCS | Performed by: NURSE PRACTITIONER

## 2018-09-11 PROCEDURE — 87624 HPV HI-RISK TYP POOLED RSLT: CPT | Mod: 90 | Performed by: NURSE PRACTITIONER

## 2018-09-11 PROCEDURE — 36415 COLL VENOUS BLD VENIPUNCTURE: CPT | Performed by: NURSE PRACTITIONER

## 2018-09-11 PROCEDURE — 87210 SMEAR WET MOUNT SALINE/INK: CPT | Performed by: NURSE PRACTITIONER

## 2018-09-11 PROCEDURE — 80048 BASIC METABOLIC PNL TOTAL CA: CPT | Performed by: NURSE PRACTITIONER

## 2018-09-11 PROCEDURE — 85025 COMPLETE CBC W/AUTO DIFF WBC: CPT | Performed by: NURSE PRACTITIONER

## 2018-09-11 PROCEDURE — 99000 SPECIMEN HANDLING OFFICE-LAB: CPT | Performed by: NURSE PRACTITIONER

## 2018-09-11 PROCEDURE — 80061 LIPID PANEL: CPT | Performed by: NURSE PRACTITIONER

## 2018-09-11 PROCEDURE — 99396 PREV VISIT EST AGE 40-64: CPT | Performed by: NURSE PRACTITIONER

## 2018-09-11 ASSESSMENT — ANXIETY QUESTIONNAIRES
7. FEELING AFRAID AS IF SOMETHING AWFUL MIGHT HAPPEN: NOT AT ALL
IF YOU CHECKED OFF ANY PROBLEMS ON THIS QUESTIONNAIRE, HOW DIFFICULT HAVE THESE PROBLEMS MADE IT FOR YOU TO DO YOUR WORK, TAKE CARE OF THINGS AT HOME, OR GET ALONG WITH OTHER PEOPLE: NOT DIFFICULT AT ALL
GAD7 TOTAL SCORE: 0
1. FEELING NERVOUS, ANXIOUS, OR ON EDGE: NOT AT ALL
2. NOT BEING ABLE TO STOP OR CONTROL WORRYING: NOT AT ALL
3. WORRYING TOO MUCH ABOUT DIFFERENT THINGS: NOT AT ALL
5. BEING SO RESTLESS THAT IT IS HARD TO SIT STILL: NOT AT ALL
4. TROUBLE RELAXING: NOT AT ALL
6. BECOMING EASILY ANNOYED OR IRRITABLE: NOT AT ALL

## 2018-09-11 ASSESSMENT — PAIN SCALES - GENERAL: PAINLEVEL: NO PAIN (0)

## 2018-09-11 NOTE — MR AVS SNAPSHOT
After Visit Summary   9/11/2018    Annamaria Bear    MRN: [id number]           Patient Information     Date Of Birth          1960        Visit Information        Provider Department      9/11/2018 8:40 AM Geni Mcmahan APRN [facility] [place]        Today's Diagnoses     Routine general medical examination at a health care facility    -  1    Tobacco abuse        Tobacco abuse counseling        Encounter for screening mammogram for breast cancer        Vaginal discharge          Care Instructions      HOW TO QUIT SMOKING  Smoking is one of the hardest habits to break. About half of all those who have ever smoked have been able to quit, and most of those (about 70%) who still smoke want to quit. Here are some of the best ways to stop smoking.     KEEP TRYING:  It takes most smokers about 8 tries before they are finally able to fully quit. So, the more often you try and fail, the better your chance of quitting the next time! So, don't give up!    GO COLD TURKEY:  Most ex-smokers quit cold turkey. Trying to cut back gradually doesn't seem to work as well, perhaps because it continues the smoking habit. Also, it is possible to fool yourself by inhaling more while smoking fewer cigarettes. This results in the same amount of nicotine in your body!    GET SUPPORT:  Support programs can make an important difference, especially for the heavy smoker. These groups offer lectures, methods to change your behavior and peer support. Call the free national Quitline for more information. 800-QUIT-NOW (866-739-9776). Low-cost or free programs are offered by many hospitals, local chapters of the American Lung Association (063-434-6272) and the American Cancer Society (095-456-8933). Support at home is important too. Non-smokers can help by offering praise and encouragement. If the smoker fails to quit, encourage them to try again!    OVER-THE-COUNTER MEDICINES:  For those who can't quit on  their own, Nicotine Replacement Therapy (NRT) may make quitting much easier. Certain aids such as the nicotine patch, gum and lozenge are available without a prescription. However, it is best to use these under the guidance of your doctor. The skin patch provides a steady supply of nicotine to the body. Nicotine gum and lozenge gives temporary bursts of low levels of nicotine. Both methods take the edge off the craving for cigarettes. WARNING: If you feel symptoms of nicotine overdose, such as nausea, vomiting, dizziness, weakness, or fast heartbeat, stop using these and see your doctor.    PRESCRIPTION MEDICINES:  After evaluating your smoking patterns and prior attempts at quitting, your doctor may offer a prescription medicine such as bupropion (Zyban, Wellbutrin), varenicline (Chantix, Champix), a niocotine inhaler or nasal spray. Each has its unique advantage and side effects which your doctor can review with you.    HEALTH BENEFITS OF QUITTING:  The benefits of quitting start right away and keep improving the longer you go without smokin minutes: blood pressure and pulse return to normal  8 hours: oxygen levels return to normal  2 days: ability to smell and taste begins to improve as damaged nerves start to regrow  2-3 weeks: circulation and lung function improves  1-9 months: decreased cough, congestion and shortness of breath; less tired  1 year: risk of heart attack decreases by half  5 years: risk of lung cancer decreases by half; risk of stroke becomes the same as a non-smoker  For information about how to quit smoking, visit the following links:  National Cancer Onia ,   Clearing the Air, Quit Smoking Today   - an online booklet. http://www.smokefree.gov/pubs/clearing_the_air.pdf  Smokefree.gov http://smokefree.gov/  QuitNet http://www.quitnet.com/    4320-7379 Verónica Robert, [address] [facility], [place] [address]. All rights reserved. This information is not intended as a substitute for  professional medical care. Always follow your healthcare professional's instructions.    The Benefits of Living Smoke Free  What do you want to gain from quitting? Check off some reasons to quit.  Health Benefits  ___ Reduce my risk of lung cancer, heart disease, chronic lung disease  ___ Have fewer wrinkles and softer skin  ___ Improve my sense of taste and smell  ___ For pregnant women--reduce the risk of having a miscarriage, stillbirth, premature birth, or low-birth-weight baby  Personal Benefits  ___ Feel more in control of my life  ___ Have better-smelling hair, breath, clothes, home, and car  ___ Save time by not having to take smoke breaks, buy cigarettes, or hunt for a light  ___ Have whiter teeth  Family Benefits  ___ Reduce my children s respiratory tract infections  ___ Set a good example for my children  ___ Reduce my family s cancer risk  Financial Benefits  ___ Save hundreds of dollars each year that would be spent on cigarettes  ___ Save money on medical bills  ___ Save on life, health, and car insurance premiums    Those Dollars Add Up!  Cigarettes are expensive, and getting more expensive all the time. Do you realize how much money you are spending on cigarettes per year? What is the average amount you spend on a pack of cigarettes? What is the average number of packs that you smoke per day? Using your answers to these questions, fill in this formula to help you find out:  ($ _____ per pack) ×  ( _____ number of packs per day) × (365 days) =  $ _____ yearly cost of smoking  Besides tobacco, there are other costs, including extra cleaning bills and replacement costs for clothing and furniture; medical expenses for smoking-related illnesses; and higher health, life, and car insurance premiums.    Cigars and Pipes Count Too!  Cigars and pipes are also dangerous. So are smokeless (chewing) tobacco and snuff. All of these products contain nicotine, a highly addictive substance that has harmful effects  on your body. Quitting smoking means giving up all tobacco products.      0603-5421 Verónica Robert, [address] [facility], [place] [address]. All rights reserved. This information is not intended as a substitute for professional medical care. Always follow your healthcare professional's instructions.  Preventive Health Recommendations  Female Ages 50 - 64    Yearly exam: See your health care provider every year in order to  o Review health changes.   o Discuss preventive care.    o Review your medicines if your doctor has prescribed any.      Get a Pap test every three years (unless you have an abnormal result and your provider advises testing more often).    If you get Pap tests with HPV test, you only need to test every 5 years, unless you have an abnormal result.     You do not need a Pap test if your uterus was removed (hysterectomy) and you have not had cancer.    You should be tested each year for STDs (sexually transmitted diseases) if you're at risk.     Have a mammogram every 1 to 2 years.    Have a colonoscopy at age 50, or have a yearly FIT test (stool test). These exams screen for colon cancer.      Have a cholesterol test every 5 years, or more often if advised.    Have a diabetes test (fasting glucose) every three years. If you are at risk for diabetes, you should have this test more often.     If you are at risk for osteoporosis (brittle bone disease), think about having a bone density scan (DEXA).    Shots: Get a flu shot each year. Get a tetanus shot every 10 years.    Nutrition:     Eat at least 5 servings of fruits and vegetables each day.    Eat whole-grain bread, whole-wheat pasta and brown rice instead of white grains and rice.    Get adequate Calcium and Vitamin D.     Lifestyle    Exercise at least 150 minutes a week (30 minutes a day, 5 days a week). This will help you control your weight and prevent disease.    Limit alcohol to one drink per day.    No smoking.     Wear sunscreen to  "prevent skin cancer.     See your dentist every six months for an exam and cleaning.    See your eye doctor every 1 to 2 years.            Follow-ups after your visit        Future tests that were ordered for you today     Open Future Orders        Priority Expected Expires Ordered    MA SCREENING DIGITAL BILATERAL ([place]) Routine  9/11/2019 9/11/2018            Who to contact     If you have questions or need follow up information about today's clinic visit or your schedule please [facility] at [phone].  Normal or non-critical lab and imaging results will be communicated to you by Spoqahart, letter or phone within 4 business days after the clinic has received the results. If you do not hear from us within 7 days, please contact the clinic through scroll kitt or phone. If you have a critical or abnormal lab result, we will notify you by phone as soon as possible.  Submit refill requests through AdTonik or call your pharmacy and they will forward the refill request to us. Please allow 3 business days for your refill to be completed.          Additional Information About Your Visit        SpoqaharOneTouchEMR Information     AdTonik gives you secure access to your electronic health record. If you see a primary care provider, you can also send messages to your care team and make appointments. If you have questions, please call your primary care clinic.  If you do not have a primary care provider, please call [phone] and they will assist you.        Care EveryWhere ID     This is your Care EveryWhere ID. This could be used by other organizations to access your [place] medical records  [id number]        Your Vitals Were     Pulse Temperature Respirations Height Pulse Oximetry BMI (Body Mass Index)    72 98.8  F (37.1  C) (Tympanic) 18 5' 2\" (1.575 m) 99% 30.18 kg/m2       Blood Pressure from Last 3 Encounters:   09/11/18 128/88   08/01/18 126/72   02/08/17 126/82    Weight from Last 3 " Encounters:   09/11/18 165 lb (74.8 kg)   08/01/18 166 lb (75.3 kg)   02/08/17 160 lb (72.6 kg)              We Performed the Following     A pap thin layer screen with  HPV - recommended age 30 - 65 years (select HPV order below)     Basic metabolic panel  (Ca, Cl, CO2, Creat, Gluc, K, Na, BUN)     CBC with platelets and differential     Lipid Profile (Chol, Trig, HDL, LDL calc)     Wet prep          Today's Medication Changes          These changes are accurate as of 9/11/18  9:04 AM.  If you have any questions, ask your nurse or doctor.               Stop taking these medicines if you haven't already. Please contact your care team if you have questions.     minoxidil 2 % external solution   Commonly known as:  ROGAINE   Stopped by:  Geni Mcmahan APRN CNP           scopolamine 72 hr patch   Commonly known as:  TRANSDERM-SCOP (1.5 MG)   Stopped by:  Geni Mcmahan APRN CNP                    Primary Care Provider Office Phone # Fax #    JOLENE Taylor [phone]       [address] [place] [id number]        Equal Access to Services     [facility] ALLA AH: Hadii aad ku hadasho Soomaali, waaxda luqadaha, qaybta kaalmada adeegyada, waxay krista haydanyel carl ah. So [facility] [phone].    ATENCIÓN: Si habla español, tiene a ames disposición servicios gratuitos de asistencia lingüística. Llame al [phone].    We comply with applicable federal civil rights laws and [place] laws. We do not discriminate on the basis of race, color, national origin, age, disability, sex, sexual orientation, or gender identity.            Thank you!     Thank you for choosing [facility]  for your care. Our goal is always to provide you with excellent care. Hearing back from our patients is one way we can continue to improve our services. Please take a few minutes to complete the written survey that you may receive in the mail after your visit with us. Thank  you!             Your Updated Medication List - Protect others around you: Learn how to safely use, store and throw away your medicines at www.disposemymeds.org.          This list is accurate as of 9/11/18  9:04 AM.  Always use your most recent med list.                   Brand Name Dispense Instructions for use Diagnosis    FIBER PO      Take 6 tablets by mouth daily        Grape Seed Oil      1 capsule daily        IBUPROFEN -38 MG Tabs   Generic drug:  Ibuprofen-Diphenhydramine Cit      Take 1 tablet by mouth nightly as needed        METAMUCIL 0.52 g capsule   Generic drug:  psyllium           * UNABLE TO FIND      MEDICATION NAME: Slippery ELM        * UNABLE TO FIND      MEDICATION NAME: Tumeric        * Notice:  This list has 2 medication(s) that are the same as other medications prescribed for you. Read the directions carefully, and ask your doctor or other care provider to review them with you.

## 2018-09-12 ASSESSMENT — PATIENT HEALTH QUESTIONNAIRE - PHQ9: SUM OF ALL RESPONSES TO PHQ QUESTIONS 1-9: 3

## 2018-09-12 ASSESSMENT — ANXIETY QUESTIONNAIRES: GAD7 TOTAL SCORE: 0

## 2018-09-14 LAB
COPATH REPORT: NORMAL
PAP: NORMAL

## 2018-09-17 LAB
FINAL DIAGNOSIS: NORMAL
HPV HR 12 DNA CVX QL NAA+PROBE: NEGATIVE
HPV16 DNA SPEC QL NAA+PROBE: NEGATIVE
HPV18 DNA SPEC QL NAA+PROBE: NEGATIVE
SPECIMEN DESCRIPTION: NORMAL
SPECIMEN SOURCE CVX/VAG CYTO: NORMAL

## 2018-09-18 ENCOUNTER — RADIANT APPOINTMENT (OUTPATIENT)
Dept: MAMMOGRAPHY | Facility: OTHER | Age: 58
End: 2018-09-18
Attending: NURSE PRACTITIONER
Payer: COMMERCIAL

## 2018-09-18 DIAGNOSIS — Z12.31 ENCOUNTER FOR SCREENING MAMMOGRAM FOR BREAST CANCER: ICD-10-CM

## 2018-09-18 PROCEDURE — 77066 DX MAMMO INCL CAD BI: CPT | Mod: TC

## 2018-09-18 PROCEDURE — G0279 TOMOSYNTHESIS, MAMMO: HCPCS | Mod: TC

## 2018-09-19 ENCOUNTER — HOSPITAL ENCOUNTER (OUTPATIENT)
Dept: ULTRASOUND IMAGING | Facility: HOSPITAL | Age: 58
Discharge: HOME | End: 2018-09-19
Attending: NURSE PRACTITIONER | Admitting: NURSE PRACTITIONER
Payer: COMMERCIAL

## 2018-09-19 DIAGNOSIS — R92.8 ABNORMAL MAMMOGRAM: ICD-10-CM

## 2018-09-19 PROCEDURE — 76642 ULTRASOUND BREAST LIMITED: CPT | Mod: TC,LT

## 2018-09-19 NOTE — PROGRESS NOTES
Patient given education on what to expect with breast biopsy.  Questions answered.  Dr. Roth notified of biopsy recommendations and no family history of breast cancer or personal history of breast procedures, but that she has had nipple inversion over the past year.  Dr. Roth states ok to schedule biopsy and results appointment. Biopsy appointment made for 9/20/18 to arrive at Hospital Registration at 0930.  Patient agreeable to this plan.  Jany CALVO.

## 2018-09-19 NOTE — LETTER
Annamaria [facility]  [address]  [place] [address]      September 19, 2018  Date of Exam: 9/19/18      Dear Annamaria:    Thank you for your recent visit.    Breast Imaging Result: Based on your recent breast imaging, you have a suspicious area that usually requires a biopsy, at which time a small tissue sample would be taken from your breast.      Breast Density: Your mammogram shows that you have dense breast tissue. This means you have a slightly higher risk of getting breast cancer. It also means your mammograms will be harder to read, but it doesn't mean that mammograms aren t useful. In fact, yearly mammograms are even more important for women at higher risk.    If you have already made these arrangements, please disregard this letter.    A report of your breast imaging results was sent to: Geni Mcmahan    Your breast imaging will become part of your medical file here at [facility] for at least 10 years. You are responsible for informing any new health care provider or breast imaging facility of the date and location of this examination.    We appreciate the opportunity to participate in your health care.    Sincerely,    Aaron Huggins MD  Interpreting Radiologist  [facility]

## 2018-09-20 ENCOUNTER — RADIANT APPOINTMENT (OUTPATIENT)
Dept: MAMMOGRAPHY | Facility: OTHER | Age: 58
End: 2018-09-20
Attending: RADIOLOGY
Payer: COMMERCIAL

## 2018-09-20 ENCOUNTER — HOSPITAL ENCOUNTER (OUTPATIENT)
Dept: ULTRASOUND IMAGING | Facility: HOSPITAL | Age: 58
Discharge: HOME | End: 2018-09-20
Attending: SURGERY | Admitting: SURGERY
Payer: COMMERCIAL

## 2018-09-20 DIAGNOSIS — N63.42 SUBAREOLAR MASS OF LEFT BREAST: ICD-10-CM

## 2018-09-20 PROCEDURE — 27210207 US BREAST BIOPSY VACUUM LEFT: Mod: TC

## 2018-09-20 PROCEDURE — 40000986 ZZH STATISTIC EXAM NOT IN OR

## 2018-09-20 PROCEDURE — 25000125 ZZHC RX 250

## 2018-09-20 PROCEDURE — 88305 TISSUE EXAM BY PATHOLOGIST: CPT | Mod: TC | Performed by: SURGERY

## 2018-09-20 RX ORDER — LIDOCAINE HYDROCHLORIDE 10 MG/ML
20 INJECTION, SOLUTION EPIDURAL; INFILTRATION; INTRACAUDAL; PERINEURAL ONCE
Status: COMPLETED | OUTPATIENT
Start: 2018-09-20 | End: 2018-09-20

## 2018-09-20 RX ORDER — LIDOCAINE HYDROCHLORIDE 10 MG/ML
INJECTION, SOLUTION EPIDURAL; INFILTRATION; INTRACAUDAL; PERINEURAL
Status: COMPLETED
Start: 2018-09-20 | End: 2018-09-20

## 2018-09-20 RX ADMIN — LIDOCAINE HYDROCHLORIDE,EPINEPHRINE BITARTRATE 5 ML: 20; .01 INJECTION, SOLUTION INFILTRATION; PERINEURAL at 10:03

## 2018-09-20 RX ADMIN — LIDOCAINE HYDROCHLORIDE 1 ML: 10 INJECTION, SOLUTION EPIDURAL; INFILTRATION; INTRACAUDAL; PERINEURAL at 10:03

## 2018-09-20 NOTE — IP AVS SNAPSHOT
[facility]    [address] [place] [address]    Phone:  [phone]                                       After Visit Summary   9/20/2018    Annamaria Baer    MRN: [id number]           After Visit Summary Signature Page     I have received my discharge instructions, and my questions have been answered. I have discussed any challenges I see with this plan with the nurse or doctor.    ..........................................................................................................................................  Patient/Patient Representative Signature      ..........................................................................................................................................  Patient Representative Print Name and Relationship to Patient    ..................................................               ................................................  Date                                   Time    ..........................................................................................................................................  Reviewed by Signature/Title    ...................................................              ..............................................  Date                                               Time          22EPIC Rev 08/18

## 2018-09-20 NOTE — IP AVS SNAPSHOT
MRN:[id number]                      After Visit Summary   9/20/2018    Annamaria Baer    MRN: [id number]           Visit Information        Provider Department      9/20/2018 10:00 AM MARGO; [facility]; [facility]           Review of your medicines      UNREVIEWED medicines. Ask your doctor about these medicines        Dose / Directions    FIBER PO        Dose:  6 tablet   Take 6 tablets by mouth daily   Refills:  0       Grape Seed Oil        Dose:  1 capsule   1 capsule daily   Refills:  0       IBUPROFEN -38 MG Tabs   Generic drug:  Ibuprofen-Diphenhydramine Cit        Dose:  1 tablet   Take 1 tablet by mouth nightly as needed   Refills:  0       METAMUCIL 0.52 g capsule   Generic drug:  psyllium        Refills:  0       * UNABLE TO FIND        MEDICATION NAME: Slippery ELM   Refills:  0       * UNABLE TO FIND        MEDICATION NAME: Tumeric   Refills:  0       * Notice:  This list has 2 medication(s) that are the same as other medications prescribed for you. Read the directions carefully, and ask your doctor or other care provider to review them with you.             Protect others around you: Learn how to safely use, store and throw away your medicines at www.disposemymeds.org.         Follow-ups after your visit        Your next 10 appointments already scheduled     Sep 20, 2018 10:00 AM CDT   (Arrive by 9:30 AM)   US BREAST BIOPSY VACUUM LEFT with HI[facility]2, [facility], [facility]   [facility] ([facility] )    [address] [place] [address] [phone]           How do I prepare for my exam? (Food and drink instructions) No Food and Drink Restrictions.  How do I prepare for my exam? (Other instructions) IF YOUR DOCTOR ALSO PRESCRIBED SEDATION DURING THE EXAM (medicine to help you relax): You will receive separate instructions about driving, eating, and additional tests that may be necessary prior to your exam day.  What should I wear: Wear comfortable clothes.   How long does the exam take: Aspiration (no sedation treatment takes about an hour.  For paracentesis, thoracentesis or sedation plan to spend at least three hours at the hospital.  What should I bring: Bring a list of your medicines, including vitamins, minerals and over-the-counter drugs. It is safest to leave personal items at home.  Do I need a :  No  is needed.  What do I need to tell my doctor: Tell your doctor in advance: * If you are or may be pregnant. * If you are taking Coumadin (or any other blood thinners) 5 days prior to the exam for any special instructions. * If you are diabetic to determine if your insulin needs have to be adjusted for the exam.  What should I do after the exam: Take it easy the rest of the day. You can return to normal activities the next day.  What is this test: This test uses a long, thin needle or tube to remove tissue, fluid, or other cells from your body. Pictures from an ultrasound will guide the needle to the right place. (Ultrasound uses sound waves to create pictures of the body on a video screen. You will not feel the sound waves.)  * A biopsy removes tissue or other cells from the body; we send the tissue or cells to a lab for testing. * Paracentesis removes fluid from the belly (abdomen) to relieve pressure, to test the fluid or both. * Thoracentesis removes fluid from the sac around the lungs to relieve pressure, to test the fluid or both. * Aspiration removes fluid from any part of the body, then the fluid is tested for disease or infection.  Who should I call with questions: If you have any questions, please call the Imaging Department where you will have your exam. Directions, parking instructions, and other information is available on our website, [email or web address].            Sep 20, 2018 10:45 AM CDT   [facility] POST PROCEDURE LEFT with [facility]   [facility] ([facility] )    [address] [address]    [phone]           Do not use any powder, lotion or deodorant under your arms or on your breast. If you do, we will ask you to remove it before your exam.  Wear comfortable, two-piece clothing.  If you have any allergies, tell your care team.  Bring any previous mammograms from other facilities or have them mailed to the breast center.            Sep 26, 2018  9:30 AM CDT   (Arrive by 9:15 AM)   New Visit with Tonio Roth MD   [facility] ([facility] )    [address]  [place] [address]   [phone]               Care Instructions        Further instructions from your care team         DISCHARGE INSTRUCTIONS FOR  BREAST BIOPSY    BREAST BIOPSY  A needle was used to locate the breast tissue. Tissue was removed to check the cells and be examined by a Pathologist. This will help your doctor plan any further treatment or follow-up. Your doctor will tell you the results of the tests in 3 to 5 days.    Follow these instructions:    Climb stairs slowly and use the hand rail. Avoid extra trips. No running or jumping.    No pushing, pulling or straining (vacuuming, shoveling, sweeping etc.)    You may shower tomorrow, pat the are dry around the tegaderm dressing.    Wear a bra at all times until your breast is fully healed.    Apply ice to the breast during the first few hours following the procedure to relieve swelling and bruising.    Steri strips stay in place for 7-10 days or until they fall off. Do not pull them off.    May remove pressure dressing after 24 hours.    Call your doctor if you have:    increased bleeding or drainage from your incision.    swelling, redness or opening of your incision.    foul smell from your incision or dressing.    red streaks from your incision.    chills or fever over 101 degrees (by mouth).    pain not helped by your pain medication.    trouble or pain with breathing.    any new problems or concerns.     Additional Information  About Your Visit        [employer] Information     [employer] gives you secure access to your electronic health record. If you see a primary care provider, you can also send messages to your care team and make appointments. If you have questions, please call your primary care clinic.  If you do not have a primary care provider, please call [phone] and they will assist you.        Care EveryWhere ID     This is your Care EveryWhere ID. This could be used by other organizations to access your [facility] medical records  [id number]         Primary Care Provider Office Phone # Fax #    JOLENE Taylor [phone]      Equal Access to Services     [facility]: Hadii marshal aranda hadasho Sosugeyali, waaxda luqadaha, qaybta kaalmada placido, dio carl . So [facility] [phone].    ATENCIÓN: Si habla español, tiene a ames disposición servicios gratuitos de asistencia lingüística. [facility] [phone].    We comply with applicable federal civil rights laws and [place] laws. We do not discriminate on the basis of race, color, national origin, age, disability, sex, sexual orientation, or gender identity.            Thank you!     Thank you for choosing [facility] for your care. Our goal is always to provide you with excellent care. Hearing back from our patients is one way we can continue to improve our services. Please take a few minutes to complete the written survey that you may receive in the mail after you visit with us. Thank you!             Medication List: This is a list of all your medications and when to take them. Check marks below indicate your daily home schedule. Keep this list as a reference.      Medications           Morning Afternoon Evening Bedtime As Needed    FIBER PO   Take 6 tablets by mouth daily                                Grape Seed Oil   1 capsule daily                                IBUPROFEN -38 MG Tabs   Take 1 tablet by mouth nightly as needed    Generic drug:  Ibuprofen-Diphenhydramine Cit                                METAMUCIL 0.52 g capsule   Generic drug:  psyllium                                * UNABLE TO FIND   MEDICATION NAME: Slippery ELM                                * UNABLE TO FIND   MEDICATION NAME: Tumeric                                * Notice:  This list has 2 medication(s) that are the same as other medications prescribed for you. Read the directions carefully, and ask your doctor or other care provider to review them with you.

## 2018-09-21 LAB — COPATH REPORT: NORMAL

## 2018-09-25 ENCOUNTER — TELEPHONE (OUTPATIENT)
Dept: MAMMOGRAPHY | Facility: OTHER | Age: 58
End: 2018-09-25

## 2018-09-25 NOTE — TELEPHONE ENCOUNTER
Dr. Roth gives the ok to give patient her results.  Patient given breast biopsy results, chooses to cancel results appointment.

## 2019-02-22 DIAGNOSIS — Z87.898 H/O MOTION SICKNESS: ICD-10-CM

## 2019-02-22 RX ORDER — SCOLOPAMINE TRANSDERMAL SYSTEM 1 MG/1
PATCH, EXTENDED RELEASE TRANSDERMAL
Qty: 3 PATCH | Refills: 0 | Status: SHIPPED | OUTPATIENT
Start: 2019-02-22 | End: 2020-03-02

## 2019-02-22 NOTE — TELEPHONE ENCOUNTER
Pt would like a motion/nausea patch  pt did not call the pharmacy cause she does not remember what it is called. It was prescribed last August September sometime. Please call into [facility]. Message sent to [place]. Pt notified Celeste Villa is out of office.

## 2019-03-12 ENCOUNTER — ANCILLARY PROCEDURE (OUTPATIENT)
Dept: MAMMOGRAPHY | Facility: OTHER | Age: 59
End: 2019-03-12
Attending: SURGERY
Payer: COMMERCIAL

## 2019-03-12 DIAGNOSIS — Z98.890 H/O LEFT BREAST BIOPSY: ICD-10-CM

## 2019-03-12 DIAGNOSIS — N63.42 SUBAREOLAR MASS OF LEFT BREAST: ICD-10-CM

## 2019-03-12 PROCEDURE — G0279 TOMOSYNTHESIS, MAMMO: HCPCS | Mod: TC

## 2019-03-12 PROCEDURE — 77065 DX MAMMO INCL CAD UNI: CPT | Mod: TC

## 2019-03-13 DIAGNOSIS — Z12.31 ENCOUNTER FOR SCREENING MAMMOGRAM FOR BREAST CANCER: Primary | ICD-10-CM

## 2019-07-26 ENCOUNTER — NURSE TRIAGE (OUTPATIENT)
Dept: FAMILY MEDICINE | Facility: OTHER | Age: 59
End: 2019-07-26

## 2019-07-26 DIAGNOSIS — R82.90 ABNORMAL URINE FINDINGS: Primary | ICD-10-CM

## 2019-07-26 DIAGNOSIS — R30.0 DYSURIA: Primary | ICD-10-CM

## 2019-07-26 LAB
ALBUMIN UR-MCNC: 30 MG/DL
APPEARANCE UR: ABNORMAL
BACTERIA #/AREA URNS HPF: ABNORMAL /HPF
BILIRUB UR QL STRIP: NEGATIVE
COLOR UR AUTO: YELLOW
GLUCOSE UR STRIP-MCNC: NEGATIVE MG/DL
HGB UR QL STRIP: ABNORMAL
KETONES UR STRIP-MCNC: NEGATIVE MG/DL
LEUKOCYTE ESTERASE UR QL STRIP: ABNORMAL
MUCOUS THREADS #/AREA URNS LPF: PRESENT /LPF
NITRATE UR QL: NEGATIVE
PH UR STRIP: 5.5 PH (ref 4.7–8)
RBC #/AREA URNS AUTO: >182 /HPF (ref 0–2)
SOURCE: ABNORMAL
SP GR UR STRIP: 1.02 (ref 1–1.03)
SQUAMOUS #/AREA URNS AUTO: 2 /HPF (ref 0–1)
UROBILINOGEN UR STRIP-MCNC: NORMAL MG/DL (ref 0–2)
WBC #/AREA URNS AUTO: >182 /HPF (ref 0–5)
WBC CLUMPS #/AREA URNS HPF: PRESENT /HPF

## 2019-07-26 PROCEDURE — 87186 SC STD MICRODIL/AGAR DIL: CPT | Performed by: PHYSICIAN ASSISTANT

## 2019-07-26 PROCEDURE — 81001 URINALYSIS AUTO W/SCOPE: CPT | Performed by: PHYSICIAN ASSISTANT

## 2019-07-26 PROCEDURE — 87088 URINE BACTERIA CULTURE: CPT | Performed by: PHYSICIAN ASSISTANT

## 2019-07-26 PROCEDURE — 87086 URINE CULTURE/COLONY COUNT: CPT | Performed by: PHYSICIAN ASSISTANT

## 2019-07-26 RX ORDER — NITROFURANTOIN 25; 75 MG/1; MG/1
100 CAPSULE ORAL 2 TIMES DAILY
Qty: 14 CAPSULE | Refills: 0 | Status: SHIPPED | OUTPATIENT
Start: 2019-07-26 | End: 2020-01-16

## 2019-07-26 NOTE — TELEPHONE ENCOUNTER
"Patient is in the lobby wondering if she can be overbooked.  States she has a UTI, painful urination, blood in her urine and frequency.  Please call her at [phone] and advise.    Reason for Disposition    Urinating more frequently than usual (i.e., frequency)    Additional Information    Negative: Shock suspected (e.g., cold/pale/clammy skin, too weak to stand, low BP, rapid pulse)    Negative: Sounds like a life-threatening emergency to the triager    Negative: Followed a genital area injury    Negative: Followed a genital area injury (penis, scrotum)    Negative: Vaginal discharge    Negative: Pus (white, yellow) or bloody discharge from end of penis    Negative: [1] Taking antibiotic for urinary tract infection (UTI) AND [2] female    Negative: [1] Taking antibiotic for urinary tract infection (UTI) AND [2] male    Negative: [1] Discomfort (pain, burning or stinging) when passing urine AND [2] pregnant    Negative: [1] Discomfort (pain, burning or stinging) when passing urine AND [2] postpartum < 1 month    Negative: [1] Discomfort (pain, burning or stinging) when passing urine AND [2] female    Negative: [1] Discomfort (pain, burning or stinging) when passing urine AND [2] male    Negative: Pain or itching in the vulvar area    Negative: Pain in scrotum is main symptom    Negative: Blood in the urine is main symptom    Negative: Symptoms arising from use of a urinary catheter (Tan or Coude)    Negative: [1] Unable to urinate (or only a few drops) > 4 hours AND     [2] bladder feels very full (e.g., palpable bladder or strong urge to urinate)    Negative: [1] Decreased urination and [2] drinking very little AND [2] dehydration suspected (e.g., dark urine, no urine > 12 hours, very dry mouth, very lightheaded)    Negative: Patient sounds very sick or weak to the triager    Negative: Fever > 100.5 F (38.1 C)    Answer Assessment - Initial Assessment Questions  1. SYMPTOM: \"What's the main symptom you're " "concerned about?\" (e.g., frequency, incontinence)      Frequency, blood in urine, painful  2. ONSET: \"When did the    start?\"      3-4 days ago  3. PAIN: \"Is there any pain?\" If so, ask: \"How bad is it?\" (Scale: 1-10; mild, moderate, severe)      yes  4. CAUSE: \"What do you think is causing the symptoms?\"      UTI  5. OTHER SYMPTOMS: \"Do you have any other symptoms?\" (e.g., fever, flank pain, blood in urine, pain with urination)      Blood in urine  6. PREGNANCY: \"Is there any chance you are pregnant?\" \"When was your last menstrual period?\"    Protocols used: URINARY SYMPTOMS-A-AH      "

## 2019-07-26 NOTE — TELEPHONE ENCOUNTER
Urine order placed for ptAlondra Barry her to lab and advised her we will call with results and send in rx if indicated.

## 2019-07-28 LAB
BACTERIA SPEC CULT: ABNORMAL
SPECIMEN SOURCE: ABNORMAL

## 2019-09-09 ENCOUNTER — ANCILLARY PROCEDURE (OUTPATIENT)
Dept: MAMMOGRAPHY | Facility: OTHER | Age: 59
End: 2019-09-09
Attending: SURGERY
Payer: COMMERCIAL

## 2019-09-09 DIAGNOSIS — Z12.31 ENCOUNTER FOR SCREENING MAMMOGRAM FOR BREAST CANCER: ICD-10-CM

## 2019-09-09 PROCEDURE — 77063 BREAST TOMOSYNTHESIS BI: CPT | Mod: TC

## 2019-09-09 PROCEDURE — 77067 SCR MAMMO BI INCL CAD: CPT | Mod: TC

## 2020-01-13 ENCOUNTER — TELEPHONE (OUTPATIENT)
Dept: FAMILY MEDICINE | Facility: OTHER | Age: 60
End: 2020-01-13

## 2020-01-13 NOTE — TELEPHONE ENCOUNTER
Patient requesting future overbook with Cleeste Villa for ER follow up in one week. Please see note below.    Patient calling and needing an ER follow up for diverticulitis in one week. Patient was in ER on 01/09/20 at [facility]. No available appointments with PCP until 02/03. Patient does not want to see covering provider and only wants to see Celeste Villa.     Patient's phone number is [phone] and it is ok to leave a message.

## 2020-01-14 NOTE — TELEPHONE ENCOUNTER
Schedule pt for appt at 1:20 on 1/16/20 with Celeste. If that does not work we will find a different time for pt.

## 2020-01-16 ENCOUNTER — OFFICE VISIT (OUTPATIENT)
Dept: FAMILY MEDICINE | Facility: OTHER | Age: 60
End: 2020-01-16
Attending: PHYSICIAN ASSISTANT
Payer: COMMERCIAL

## 2020-01-16 VITALS
OXYGEN SATURATION: 98 % | DIASTOLIC BLOOD PRESSURE: 80 MMHG | SYSTOLIC BLOOD PRESSURE: 140 MMHG | BODY MASS INDEX: 29.63 KG/M2 | HEART RATE: 66 BPM | WEIGHT: 162 LBS

## 2020-01-16 DIAGNOSIS — K57.32 DIVERTICULITIS OF COLON: Primary | ICD-10-CM

## 2020-01-16 DIAGNOSIS — Z86.012 HX OF BENIGN CARCINOID TUMOR: ICD-10-CM

## 2020-01-16 DIAGNOSIS — G89.29 ABDOMINAL PAIN, CHRONIC, GENERALIZED: ICD-10-CM

## 2020-01-16 DIAGNOSIS — Z12.11 SPECIAL SCREENING FOR MALIGNANT NEOPLASMS, COLON: ICD-10-CM

## 2020-01-16 DIAGNOSIS — R10.84 ABDOMINAL PAIN, CHRONIC, GENERALIZED: ICD-10-CM

## 2020-01-16 LAB
BASOPHILS # BLD AUTO: 0 10E9/L (ref 0–0.2)
BASOPHILS NFR BLD AUTO: 0.4 %
CRP SERPL-MCNC: 4 MG/L (ref 0–8)
DIFFERENTIAL METHOD BLD: ABNORMAL
EOSINOPHIL # BLD AUTO: 0.3 10E9/L (ref 0–0.7)
EOSINOPHIL NFR BLD AUTO: 2.8 %
ERYTHROCYTE [DISTWIDTH] IN BLOOD BY AUTOMATED COUNT: 12.4 % (ref 10–15)
HCT VFR BLD AUTO: 44.9 % (ref 35–47)
HGB BLD-MCNC: 15.6 G/DL (ref 11.7–15.7)
IMM GRANULOCYTES # BLD: 0 10E9/L (ref 0–0.4)
IMM GRANULOCYTES NFR BLD: 0.4 %
LYMPHOCYTES # BLD AUTO: 4.1 10E9/L (ref 0.8–5.3)
LYMPHOCYTES NFR BLD AUTO: 36.5 %
MCH RBC QN AUTO: 30.4 PG (ref 26.5–33)
MCHC RBC AUTO-ENTMCNC: 34.7 G/DL (ref 31.5–36.5)
MCV RBC AUTO: 88 FL (ref 78–100)
MONOCYTES # BLD AUTO: 0.9 10E9/L (ref 0–1.3)
MONOCYTES NFR BLD AUTO: 7.8 %
NEUTROPHILS # BLD AUTO: 5.8 10E9/L (ref 1.6–8.3)
NEUTROPHILS NFR BLD AUTO: 52.1 %
NRBC # BLD AUTO: 0 10*3/UL
NRBC BLD AUTO-RTO: 0 /100
PLATELET # BLD AUTO: 244 10E9/L (ref 150–450)
RBC # BLD AUTO: 5.13 10E12/L (ref 3.8–5.2)
WBC # BLD AUTO: 11.1 10E9/L (ref 4–11)

## 2020-01-16 PROCEDURE — 99214 OFFICE O/P EST MOD 30 MIN: CPT | Performed by: PHYSICIAN ASSISTANT

## 2020-01-16 PROCEDURE — 36415 COLL VENOUS BLD VENIPUNCTURE: CPT | Performed by: PHYSICIAN ASSISTANT

## 2020-01-16 PROCEDURE — 86140 C-REACTIVE PROTEIN: CPT | Performed by: PHYSICIAN ASSISTANT

## 2020-01-16 PROCEDURE — 85025 COMPLETE CBC W/AUTO DIFF WBC: CPT | Performed by: PHYSICIAN ASSISTANT

## 2020-01-16 ASSESSMENT — PAIN SCALES - GENERAL: PAINLEVEL: NO PAIN (0)

## 2020-01-16 NOTE — NURSING NOTE
"Chief Complaint   Patient presents with     Hospital F/U       Initial BP (!) 140/80 (BP Location: Left arm, Patient Position: Chair, Cuff Size: Adult Regular)   Pulse 66   Wt 73.5 kg (162 lb)   SpO2 98%   BMI 29.63 kg/m   Estimated body mass index is 29.63 kg/m  as calculated from the following:    Height as of 9/11/18: 1.575 m (5' 2\").    Weight as of this encounter: 73.5 kg (162 lb).  Medication Reconciliation: complete  Digna Hathaway LPN  "

## 2020-01-16 NOTE — PROGRESS NOTES
Subjective     Annamaria Baer is a 59 year old female who presents to clinic today for the following health issues:    HPI   ED/UC Followup:    Facility: [facility]   Date of visit: 2020  Reason for visit: Diverticulitis   Current Status: pain everyday , but has been doing better since the ABX            Patient Active Problem List   Diagnosis     History of colectomy     Enthesopathy of hip region     Diverticulosis of large intestine     Past Surgical History:   Procedure Laterality Date     ANKLE SURGERY Left       SECTION      X2     COLONOSCOPY  2014    mild Diverticulosis - Dr. Chowdhury     COLONOSCOPY  2009    lucas yen  [facility]     COLONOSCOPY - HIM SCAN  2015    Hyperplastic Polyp - Dr. Chowdhury     GALLBLADDER SURGERY       RESECTION ABDOMINAL PERINEAL         Social History     Tobacco Use     Smoking status: Former Smoker     Packs/day: 1.50     Years: 24.00     Pack years: 36.00     Types: Cigarettes     Start date: 1976     Last attempt to quit: 1/3/2000     Years since quittin.0     Smokeless tobacco: Never Used   Substance Use Topics     Alcohol use: Yes     Alcohol/week: 2.0 standard drinks     Types: 1 Standard drinks or equivalent, 1 Glasses of wine per week     Family History   Problem Relation Age of Onset     Cerebrovascular Disease Mother      Cirrhosis Father      Bone Cancer Maternal Grandmother      Diabetes Paternal Grandmother      Cancer Paternal Grandfather          Current Outpatient Medications   Medication Sig Dispense Refill     FIBER PO Take 6 tablets by mouth daily       Grape Seed OIL 1 capsule daily       psyllium (METAMUCIL) 0.52 G capsule        UNABLE TO FIND MEDICATION NAME: Slippery ELM       UNABLE TO FIND MEDICATION NAME: Tumeric       Ibuprofen-Diphenhydramine Cit (IBUPROFEN PM) 200-38 MG TABS Take 1 tablet by mouth nightly as needed       scopolamine (TRANSDERM) 1 MG/3DAYS 72 hr patch Apply 1 patch  to hairless area behind one ear at least 4 hours before travel.  Remove old patch and change every 3 days (72 hours). (Patient not taking: Reported on 1/16/2020) 3 patch 0     Allergies   Allergen Reactions     Minocycline Other (See Comments) and Itching     Sulfa Drugs Hives     Recent Labs   Lab Test 09/11/18  0908 02/08/17  1002   *  --    HDL 48*  --    TRIG 140  --    ALT  --  44   CR 0.66 0.73   GFRESTIMATED >90 83   GFRESTBLACK >90 >90  African American GFR Calc     POTASSIUM 4.0 3.9      BP Readings from Last 3 Encounters:   01/16/20 (!) 140/80   09/11/18 128/88   08/01/18 126/72    Wt Readings from Last 3 Encounters:   01/16/20 73.5 kg (162 lb)   09/11/18 74.8 kg (165 lb)   08/01/18 75.3 kg (166 lb)                      Reviewed and updated as needed this visit by Provider         Review of Systems   ROS COMP: Constitutional, HEENT, cardiovascular, pulmonary, gi and gu systems are negative, except as otherwise noted.      Objective    BP (!) 140/80 (BP Location: Left arm, Patient Position: Chair, Cuff Size: Adult Regular)   Pulse 66   Wt 73.5 kg (162 lb)   SpO2 98%   BMI 29.63 kg/m    Body mass index is 29.63 kg/m .  Physical Exam   GENERAL: healthy, alert and no distress  EYES: Eyes grossly normal to inspection, PERRL and conjunctivae and sclerae normal  HENT: ear canals and TM's normal, nose and mouth without ulcers or lesions  NECK: no adenopathy, no asymmetry, masses, or scars and thyroid normal to palpation  RESP: lungs clear to auscultation - no rales, rhonchi or wheezes  CV: regular rate and rhythm, normal S1 S2, no S3 or S4, no murmur, click or rub, no peripheral edema and peripheral pulses strong  ABDOMEN: soft, tender in left upper quadrant across the mid colon transfers region.  No rebound. , no hepatosplenomegaly, no masses and bowel sounds normal.  MS: no gross musculoskeletal defects noted, no edema    Diagnostic Test Results:  Labs reviewed in Epic  Results for orders placed or  performed in visit on 01/16/20   CBC with platelets and differential     Status: Abnormal   Result Value Ref Range    WBC 11.1 (H) 4.0 - 11.0 10e9/L    RBC Count 5.13 3.8 - 5.2 10e12/L    Hemoglobin 15.6 11.7 - 15.7 g/dL    Hematocrit 44.9 35.0 - 47.0 %    MCV 88 78 - 100 fl    MCH 30.4 26.5 - 33.0 pg    MCHC 34.7 31.5 - 36.5 g/dL    RDW 12.4 10.0 - 15.0 %    Platelet Count 244 150 - 450 10e9/L    Diff Method Automated Method     % Neutrophils 52.1 %    % Lymphocytes 36.5 %    % Monocytes 7.8 %    % Eosinophils 2.8 %    % Basophils 0.4 %    % Immature Granulocytes 0.4 %    Nucleated RBCs 0 0 /100    Absolute Neutrophil 5.8 1.6 - 8.3 10e9/L    Absolute Lymphocytes 4.1 0.8 - 5.3 10e9/L    Absolute Monocytes 0.9 0.0 - 1.3 10e9/L    Absolute Eosinophils 0.3 0.0 - 0.7 10e9/L    Absolute Basophils 0.0 0.0 - 0.2 10e9/L    Abs Immature Granulocytes 0.0 0 - 0.4 10e9/L    Absolute Nucleated RBC 0.0    CRP inflammation     Status: None   Result Value Ref Range    CRP Inflammation 4.0 0.0 - 8.0 mg/L           Assessment & Plan     1. Diverticulitis of colon  Her WBC is better and CRP is down and her pain is better but still present.  We will have her see GI for consult.   needing screening. Hx of benign carcinoid tumor.   Recheck on her colon. certainly could have adhesions from her colon surgery in the past. Very tearful today. Always has pain in her colon.   - GASTROENTEROLOGY ADULT REF CONSULT ONLY  - CBC with platelets and differential  - CRP inflammation    2. Special screening for malignant neoplasms, colon  Given today.   - GASTROENTEROLOGY ADULT REF CONSULT ONLY    3. Abdominal pain, chronic, generalized  Given today. Will let me know if helpful.   - GASTROENTEROLOGY ADULT REF CONSULT ONLY  - hyoscyamine SL (LEVSIN/SL) 0.125 MG sublingual tablet; Take 1 tablet (0.125 mg) by mouth 4 times daily (before meals and nightly)  Dispense: 120 tablet; Refill: 0    4. Hx of benign carcinoid tumor  She will be seeing us back in a  month and make sure she is getting the referrals she is needing.   - GASTROENTEROLOGY ADULT REF CONSULT ONLY       See Patient Instructions    No follow-ups on file.    JOLENE Turcios  [facility]

## 2020-01-16 NOTE — PATIENT INSTRUCTIONS
Thank you for choosing [facility].   I have office hours 8:00 am to 4:30 pm on Monday's, Wednesday's, Thursday's and Friday's. My nurse and I are out of the office every Tuesday.    Following your visit, when your labs and diagnostic testing have returned, I will review then and you will be contacted by my nurse.  If you are on My Chart, you can also view results there.    For refills, notify your pharmacy regarding what you need and the pharmacy will generate a refill request. Do not call my nurse as she is unable to process refill request. Please plan ahead and allow 3-5 days for refill requests.    You will generally receive a reminder call the day prior to your appointment.  If you cannot attend your appointment, please cancel your appointment with as much notice as possible.  If there is a pattern of failure to present for your appointments, I cannot provide consistent, meaningful, ongoing care for you. It is very important to me that you come in for your care, so we can best assist you with your health care needs.    IMPORTANT:  Please note that it is my standard of practice to NOT participate in prescribing ongoing requested Narcotic Analgesic therapy, and/or participate in the prescribing of other controlled substances.  My nurse and I am happy to assist you with the process of referral for alternative pain management as needed, and other treatment modalities including but not limited to:  Physical Therapy, Physical Medicine and Rehab, Counseling, Chiropractic Care, Orthopedic Care, and non-narcotic medication management.     In the event that you need to be seen for emergent concerns and I am out of office,  please see one of my colleagues for acute concerns.  You may also present to  or ER.  I appreciate the opportunity to serve you and look forward to supporting your healthcare needs in the future. Please contact me with any questions or concerns that you may  have.    Sincerely,      Celeste Villa RN, PA-C

## 2020-02-17 DIAGNOSIS — G89.29 ABDOMINAL PAIN, CHRONIC, GENERALIZED: ICD-10-CM

## 2020-02-17 DIAGNOSIS — R10.84 ABDOMINAL PAIN, CHRONIC, GENERALIZED: ICD-10-CM

## 2020-02-19 NOTE — TELEPHONE ENCOUNTER
HYOSCYAMINE 0.125 MG TAB SL  Last Written Prescription Date:  1/16/2020  Last Fill Quantity: 120,   # refills: 0  Last Office Visit: 9/11/2018  Future Office visit:    Next 5 appointments (look out 90 days)    Mar 02, 2020  3:40 PM CST  (Arrive by 3:25 PM)  SHORT with JOLENE Taylor  [facility] ([facility] [place] ) [address]  [place] [address]  [phone]

## 2020-02-27 NOTE — PROGRESS NOTES
Subjective     Annamaria Baer is a 59 year old female who presents to clinic today for the following health issues:    HPI   Abdominal Pain      Duration:     Description (location/character/radiation): had an episode of diverticulitis - was on antibiotics- colonoscopy scheduled 3/9/20- Ritesh Pereira       Associated flank pain: None    Intensity:  0/10    Accompanying signs and symptoms:        Fever/Chills: no        Gas/Bloating: YES       Nausea/vomitting: YES       Diarrhea: YES       Dysuria or Hematuria: no     History (previous similar pain/trauma/previous testing): diverticulitis     Precipitating or alleviating factors:       Pain worse with eating/BM/urination: no       Pain relieved by BM: no     Therapies tried and outcome: None    LMP:  not applicable    Rash      Duration: 20    Description  Location: back and stomach  Itching: moderate    Intensity:  moderate    Accompanying signs and symptoms: None    History (similar episodes/previous evaluation): None    Precipitating or alleviating factors:  New exposures:  Medication on 20 was on augmentin  Recent travel: no      Therapies tried and outcome: none      Patient Active Problem List   Diagnosis     History of colectomy     Enthesopathy of hip region     Diverticulosis of large intestine     Diverticulitis of colon     Abdominal pain, chronic, generalized     Hx of benign carcinoid tumor     Past Surgical History:   Procedure Laterality Date     ANKLE SURGERY Left       SECTION      X2     COLONOSCOPY  2014    mild Diverticulosis - Dr. Chowdhuyr     COLONOSCOPY  2009    lucas yen  [facility]     COLONOSCOPY - HIM SCAN  2015    Hyperplastic Polyp - Dr. Chowdhruy     GALLBLADDER SURGERY       RESECTION ABDOMINAL PERINEAL         Social History     Tobacco Use     Smoking status: Former Smoker     Packs/day: 1.50     Years: 24.00     Pack years: 36.00     Types: Cigarettes     Start  date: 1976     Last attempt to quit: 1/3/2000     Years since quittin.1     Smokeless tobacco: Never Used   Substance Use Topics     Alcohol use: Yes     Alcohol/week: 2.0 standard drinks     Types: 1 Standard drinks or equivalent, 1 Glasses of wine per week     Family History   Problem Relation Age of Onset     Cerebrovascular Disease Mother      Cirrhosis Father      Bone Cancer Maternal Grandmother      Diabetes Paternal Grandmother      Cancer Paternal Grandfather          Current Outpatient Medications   Medication Sig Dispense Refill     FIBER PO Take 6 tablets by mouth daily       Grape Seed OIL 1 capsule daily       Ibuprofen-Diphenhydramine Cit (IBUPROFEN PM) 200-38 MG TABS Take 1 tablet by mouth nightly as needed       psyllium (METAMUCIL) 0.52 G capsule        scopolamine (TRANSDERM) 1 MG/3DAYS 72 hr patch Place 1 patch onto the skin every 72 hours 2 patch 0     UNABLE TO FIND MEDICATION NAME: Slippery ELM       UNABLE TO FIND MEDICATION NAME: Tumeric       Allergies   Allergen Reactions     Minocycline Other (See Comments) and Itching     Sulfa Drugs Hives     Recent Labs   Lab Test 18  0908 17  1002   *  --    HDL 48*  --    TRIG 140  --    ALT  --  44   CR 0.66 0.73   GFRESTIMATED >90 83   GFRESTBLACK >90 >90  African American GFR Calc     POTASSIUM 4.0 3.9      BP Readings from Last 3 Encounters:   20 124/82   20 (!) 140/80   18 128/88    Wt Readings from Last 3 Encounters:   20 74.4 kg (164 lb)   20 73.5 kg (162 lb)   18 74.8 kg (165 lb)                      Reviewed and updated as needed this visit by Provider         Review of Systems   ROS COMP: Constitutional, HEENT, cardiovascular, pulmonary, gi and gu systems are negative, except as otherwise noted.      Objective    /82   Pulse 74   Temp 98.2  F (36.8  C) (Tympanic)   Wt 74.4 kg (164 lb)   SpO2 95%   BMI 30.00 kg/m    Body mass index is 30 kg/m .  Physical Exam    GENERAL: healthy, alert and no distress  EYES: Eyes grossly normal to inspection, PERRL and conjunctivae and sclerae normal  HENT: ear canals and TM's normal, nose and mouth without ulcers or lesions  NECK: no adenopathy, no asymmetry, masses, or scars and thyroid normal to palpation  RESP: lungs clear to auscultation - no rales, rhonchi or wheezes  CV: regular rate and rhythm, normal S1 S2, no S3 or S4, no murmur, click or rub, no peripheral edema and peripheral pulses strong  ABDOMEN: soft, nontender, no hepatosplenomegaly, no masses and bowel sounds normal  MS: no gross musculoskeletal defects noted, no edema  SKIN: no suspicious lesions or has fine raised rash on her back. Very pinpoint and red.   NEURO: Normal strength and tone, mentation intact and speech normal  LYMPH: no cervical, supraclavicular, axillary, or inguinal adenopathy    Diagnostic Test Results:  Labs reviewed in Epic  No results found for any visits on 03/02/20.        Assessment & Plan     1. Travel advice encounter  Going to [place] , Pepto, Cipro and meclozine.   - scopolamine (TRANSDERM) 1 MG/3DAYS 72 hr patch; Place 1 patch onto the skin every 72 hours  Dispense: 2 patch; Refill: 0    2. Diverticulitis of colon  Having a colon screening done next week in [place] with GI. They had to hold off due to antibiotic use and had an attack.     3. Colon spasm  Levsin is given and never been helping.      4. Globus pharyngeus  She is going to be given below. Seen ENT. Has been on going over a month. Having colons screening done. ? Scope in ENT due to this feeling in her throat that won't go away.   - TSH with free T4 reflex  - EKG 12-lead complete w/read - (Clinic Performed)  - OTOLARYNGOLOGY REFERRAL        5. Other atopic dermatitis  She is tanning and using cream on her back more likely related to the tanning and cream.                See Patient Instructions    Return in about 3 months (around 6/2/2020).    JOLENE Turcios  [facility]  CLINICS - [place]

## 2020-03-02 ENCOUNTER — OFFICE VISIT (OUTPATIENT)
Dept: FAMILY MEDICINE | Facility: OTHER | Age: 60
End: 2020-03-02
Attending: PHYSICIAN ASSISTANT
Payer: COMMERCIAL

## 2020-03-02 VITALS
SYSTOLIC BLOOD PRESSURE: 124 MMHG | TEMPERATURE: 98.2 F | BODY MASS INDEX: 30 KG/M2 | OXYGEN SATURATION: 95 % | HEART RATE: 74 BPM | DIASTOLIC BLOOD PRESSURE: 82 MMHG | WEIGHT: 164 LBS

## 2020-03-02 DIAGNOSIS — Z71.84 TRAVEL ADVICE ENCOUNTER: Primary | ICD-10-CM

## 2020-03-02 DIAGNOSIS — K58.9 COLON SPASM: ICD-10-CM

## 2020-03-02 DIAGNOSIS — K57.32 DIVERTICULITIS OF COLON: ICD-10-CM

## 2020-03-02 DIAGNOSIS — L20.89 OTHER ATOPIC DERMATITIS: ICD-10-CM

## 2020-03-02 DIAGNOSIS — R09.A2 GLOBUS PHARYNGEUS: ICD-10-CM

## 2020-03-02 LAB
T4 FREE SERPL-MCNC: 1.05 NG/DL (ref 0.76–1.46)
TSH SERPL DL<=0.005 MIU/L-ACNC: 4.73 MU/L (ref 0.4–4)

## 2020-03-02 PROCEDURE — 36415 COLL VENOUS BLD VENIPUNCTURE: CPT | Performed by: PHYSICIAN ASSISTANT

## 2020-03-02 PROCEDURE — 99214 OFFICE O/P EST MOD 30 MIN: CPT | Mod: 25 | Performed by: PHYSICIAN ASSISTANT

## 2020-03-02 PROCEDURE — 93000 ELECTROCARDIOGRAM COMPLETE: CPT | Performed by: INTERNAL MEDICINE

## 2020-03-02 PROCEDURE — 84439 ASSAY OF FREE THYROXINE: CPT | Performed by: PHYSICIAN ASSISTANT

## 2020-03-02 PROCEDURE — 84443 ASSAY THYROID STIM HORMONE: CPT | Performed by: PHYSICIAN ASSISTANT

## 2020-03-02 RX ORDER — CIPROFLOXACIN 500 MG/1
500 TABLET, FILM COATED ORAL 2 TIMES DAILY
Qty: 20 TABLET | Refills: 0 | Status: SHIPPED | OUTPATIENT
Start: 2020-03-02 | End: 2020-09-03

## 2020-03-02 RX ORDER — SCOLOPAMINE TRANSDERMAL SYSTEM 1 MG/1
1 PATCH, EXTENDED RELEASE TRANSDERMAL
Qty: 2 PATCH | Refills: 0 | Status: SHIPPED | OUTPATIENT
Start: 2020-03-02 | End: 2021-07-07

## 2020-03-02 ASSESSMENT — PAIN SCALES - GENERAL: PAINLEVEL: NO PAIN (0)

## 2020-03-02 ASSESSMENT — PATIENT HEALTH QUESTIONNAIRE - PHQ9: SUM OF ALL RESPONSES TO PHQ QUESTIONS 1-9: 0

## 2020-03-02 ASSESSMENT — ANXIETY QUESTIONNAIRES
3. WORRYING TOO MUCH ABOUT DIFFERENT THINGS: NOT AT ALL
5. BEING SO RESTLESS THAT IT IS HARD TO SIT STILL: NOT AT ALL
7. FEELING AFRAID AS IF SOMETHING AWFUL MIGHT HAPPEN: NOT AT ALL
4. TROUBLE RELAXING: NOT AT ALL
1. FEELING NERVOUS, ANXIOUS, OR ON EDGE: NOT AT ALL
GAD7 TOTAL SCORE: 0
2. NOT BEING ABLE TO STOP OR CONTROL WORRYING: NOT AT ALL
6. BECOMING EASILY ANNOYED OR IRRITABLE: NOT AT ALL

## 2020-03-02 NOTE — NURSING NOTE
"No chief complaint on file.      Initial /82   Pulse 74   Temp 98.2  F (36.8  C) (Tympanic)   Wt 74.4 kg (164 lb)   SpO2 95%   BMI 30.00 kg/m   Estimated body mass index is 30 kg/m  as calculated from the following:    Height as of 9/11/18: 1.575 m (5' 2\").    Weight as of this encounter: 74.4 kg (164 lb).  Medication Reconciliation: complete  Shreya Asher LPN    "

## 2020-03-03 ASSESSMENT — ANXIETY QUESTIONNAIRES: GAD7 TOTAL SCORE: 0

## 2020-03-05 ENCOUNTER — TELEPHONE (OUTPATIENT)
Dept: FAMILY MEDICINE | Facility: OTHER | Age: 60
End: 2020-03-05

## 2020-03-05 NOTE — TELEPHONE ENCOUNTER
Patient given results for thyroid.  Still off minimally and can go with this dose as the number is so close and she is feeling well.  T4 is fine.  Patient is ok with this.  Will discuss at next f/u visit.

## 2020-03-05 NOTE — TELEPHONE ENCOUNTER
----- Message from JOLENE Taylor sent at 3/3/2020  9:15 AM CST -----  Still off minimally and can go with this dose as the  number is so close and she is feeling well. . Her T 4 is fine.

## 2020-03-09 ENCOUNTER — TELEPHONE (OUTPATIENT)
Dept: FAMILY MEDICINE | Facility: OTHER | Age: 60
End: 2020-03-09

## 2020-03-09 DIAGNOSIS — K57.32 DIVERTICULITIS OF COLON: Primary | ICD-10-CM

## 2020-03-09 DIAGNOSIS — R09.A2 GLOBUS PHARYNGEUS: ICD-10-CM

## 2020-03-09 NOTE — TELEPHONE ENCOUNTER
Patient is requesting a new referral for her colonoscopy and endoscopy. Referral has been pended for Celeste to sign. Shania Ziegler LPN

## 2020-03-09 NOTE — TELEPHONE ENCOUNTER
PCP Daisy.    Pt was scheduled today for colonoscopy today. She is suppose to have an endoscopy as well.    She would like both done at the same time in [place] with Dr.Erin Lanier at [facility]. Needs referral.    She would then cancel the scope on the 27th.      Please all pt back at [phone].

## 2020-03-12 NOTE — TELEPHONE ENCOUNTER
Patricia from [facility] called stating they need a referral for a procedure only Endoscopy with DX: Globus Pharyngeus. Please place new referral

## 2020-03-13 DIAGNOSIS — R09.A2 GLOBUS PHARYNGEUS: Primary | ICD-10-CM

## 2020-06-02 ENCOUNTER — TRANSFERRED RECORDS (OUTPATIENT)
Dept: HEALTH INFORMATION MANAGEMENT | Facility: CLINIC | Age: 60
End: 2020-06-02

## 2020-09-02 NOTE — PROGRESS NOTES
"Subjective     Annamaria Baer is a 59 year old female who presents to clinic today for the following health issues:    HPI       Musculoskeletal problem/pain  Onset/Duration: 1 year  Description  Location: Hip - right  Joint Swelling: no  Redness: no  Pain: YES sharp pain that comes on suddenly and goes quickly   Warmth: no  Intensity:  moderate  Progression of Symptoms:  worsening  Accompanying signs and symptoms:   Fevers: no  Numbness/tingling/weakness: no  History  Trauma to the area: no  Recent illness:  no  Previous similar problem: no  Previous evaluation:  no  Precipitating or alleviating factors:  Aggravating factors include: standing, walking, climbing stairs, exercise and overuse  Therapies tried and outcome: rest/inactivity      Review of Systems   Constitutional, HEENT, cardiovascular, pulmonary, gi and gu systems are negative, except as otherwise noted.      Objective    BP (!) 148/82 (BP Location: Right arm, Patient Position: Sitting, Cuff Size: Adult Regular)   Pulse 69   Temp 98.7  F (37.1  C) (Tympanic)   Ht 1.575 m (5' 2\")   Wt 72.6 kg (160 lb)   SpO2 98%   BMI 29.26 kg/m    Body mass index is 29.26 kg/m .  Physical Exam   GENERAL: healthy, alert and no distress  EYES: Eyes grossly normal to inspection, PERRL and conjunctivae and sclerae normal  HENT: ear canals and TM's normal, nose and mouth without ulcers or lesions  NECK: no adenopathy, no asymmetry, masses, or scars and thyroid normal to palpation  RESP: lungs clear to auscultation - no rales, rhonchi or wheezes  CV: regular rate and rhythm, normal S1 S2, no S3 or S4, no murmur, click or rub, no peripheral edema and peripheral pulses strong  MS: can't stand and feel like her hip will hold her weight.  Into her groin. Takes a few mintutes to get adjusted and then can walk.     Xray - arthritis right hip and bursal tenderness with limited ROM in frog leg. Increase in pain.         Assessment & Plan     PUD (peptic ulcer disease)  Saw " "[email or web address]. On two kinds of mediations. Can't taper on Carafate. Continue and see them back ? Scope again. .     Hip pain, right  She is possible has a labrum tear. We will see her back once she sees Dr. Berrios. No use of NSAIDS. Active ulcer disease.   - XR Hip Right 2-3 Views (Clinic Performed); Future  - ORTHOPEDIC ADULT REFERRAL; Future     BMI:   Estimated body mass index is 29.26 kg/m  as calculated from the following:    Height as of this encounter: 1.575 m (5' 2\").    Weight as of this encounter: 72.6 kg (160 lb).           See Patient Instructions    No follow-ups on file.    JOLENE Turcios  [facility] - [place]    "

## 2020-09-03 ENCOUNTER — ANCILLARY PROCEDURE (OUTPATIENT)
Dept: GENERAL RADIOLOGY | Facility: OTHER | Age: 60
End: 2020-09-03
Attending: PHYSICIAN ASSISTANT
Payer: COMMERCIAL

## 2020-09-03 ENCOUNTER — OFFICE VISIT (OUTPATIENT)
Dept: FAMILY MEDICINE | Facility: OTHER | Age: 60
End: 2020-09-03
Attending: PHYSICIAN ASSISTANT
Payer: COMMERCIAL

## 2020-09-03 VITALS
WEIGHT: 160 LBS | SYSTOLIC BLOOD PRESSURE: 148 MMHG | BODY MASS INDEX: 29.44 KG/M2 | DIASTOLIC BLOOD PRESSURE: 82 MMHG | HEART RATE: 69 BPM | HEIGHT: 62 IN | TEMPERATURE: 98.7 F | OXYGEN SATURATION: 98 %

## 2020-09-03 DIAGNOSIS — K27.9 PUD (PEPTIC ULCER DISEASE): Primary | ICD-10-CM

## 2020-09-03 DIAGNOSIS — M25.551 HIP PAIN, RIGHT: ICD-10-CM

## 2020-09-03 PROCEDURE — 99213 OFFICE O/P EST LOW 20 MIN: CPT | Performed by: PHYSICIAN ASSISTANT

## 2020-09-03 PROCEDURE — 73502 X-RAY EXAM HIP UNI 2-3 VIEWS: CPT | Mod: TC

## 2020-09-03 RX ORDER — SUCRALFATE 1 G/1
1 TABLET ORAL DAILY
COMMUNITY
Start: 2020-07-28

## 2020-09-03 ASSESSMENT — MIFFLIN-ST. JEOR: SCORE: 1264.01

## 2020-09-03 ASSESSMENT — PAIN SCALES - GENERAL: PAINLEVEL: MILD PAIN (2)

## 2020-09-03 NOTE — NURSING NOTE
"Chief Complaint   Patient presents with     Musculoskeletal Problem       Initial BP (!) 148/82 (BP Location: Right arm, Patient Position: Sitting, Cuff Size: Adult Regular)   Pulse 69   Temp 98.7  F (37.1  C) (Tympanic)   Ht 1.575 m (5' 2\")   Wt 72.6 kg (160 lb)   SpO2 98%   BMI 29.26 kg/m   Estimated body mass index is 29.26 kg/m  as calculated from the following:    Height as of this encounter: 1.575 m (5' 2\").    Weight as of this encounter: 72.6 kg (160 lb).  Medication Reconciliation: complete  Kathy Hemphill LPN  "

## 2020-12-04 ENCOUNTER — ANCILLARY PROCEDURE (OUTPATIENT)
Dept: MAMMOGRAPHY | Facility: OTHER | Age: 60
End: 2020-12-04
Attending: PHYSICIAN ASSISTANT
Payer: COMMERCIAL

## 2020-12-04 DIAGNOSIS — Z12.31 VISIT FOR SCREENING MAMMOGRAM: ICD-10-CM

## 2020-12-04 PROCEDURE — 77063 BREAST TOMOSYNTHESIS BI: CPT | Mod: TC | Performed by: RADIOLOGY

## 2020-12-04 PROCEDURE — 77067 SCR MAMMO BI INCL CAD: CPT | Mod: TC | Performed by: RADIOLOGY

## 2021-02-02 ENCOUNTER — TELEPHONE (OUTPATIENT)
Dept: FAMILY MEDICINE | Facility: OTHER | Age: 61
End: 2021-02-02

## 2021-02-02 NOTE — TELEPHONE ENCOUNTER
Call from patient to scheduled covid 19 testing prior to travel to [place]. Appt scheduled:    Next 5 appointments (look out 90 days)    Mar 12, 2021  9:00 AM  (Arrive by 8:45 AM)  [facility] with [facility]  [facility] ([facility] ) [address]  [facility] [address]  [phone]        Assisted patient in setting up MyChart during Triage Call so patient will have access to [employer].

## 2021-03-06 ENCOUNTER — HEALTH MAINTENANCE LETTER (OUTPATIENT)
Age: 61
End: 2021-03-06

## 2021-03-09 ENCOUNTER — OFFICE VISIT (OUTPATIENT)
Dept: FAMILY MEDICINE | Facility: OTHER | Age: 61
End: 2021-03-09
Attending: PHYSICIAN ASSISTANT
Payer: COMMERCIAL

## 2021-03-09 DIAGNOSIS — Z78.9 PATIENT TRAVELS: Primary | ICD-10-CM

## 2021-03-09 LAB
SARS-COV-2 RNA RESP QL NAA+PROBE: NORMAL
SPECIMEN SOURCE: NORMAL

## 2021-03-09 PROCEDURE — U0005 INFEC AGEN DETEC AMPLI PROBE: HCPCS | Performed by: PHYSICIAN ASSISTANT

## 2021-03-09 PROCEDURE — U0003 INFECTIOUS AGENT DETECTION BY NUCLEIC ACID (DNA OR RNA); SEVERE ACUTE RESPIRATORY SYNDROME CORONAVIRUS 2 (SARS-COV-2) (CORONAVIRUS DISEASE [COVID-19]), AMPLIFIED PROBE TECHNIQUE, MAKING USE OF HIGH THROUGHPUT TECHNOLOGIES AS DESCRIBED BY CMS-2020-01-R: HCPCS | Performed by: PHYSICIAN ASSISTANT

## 2021-03-10 ENCOUNTER — TELEPHONE (OUTPATIENT)
Dept: FAMILY MEDICINE | Facility: OTHER | Age: 61
End: 2021-03-10

## 2021-03-10 LAB
LABORATORY COMMENT REPORT: ABNORMAL
SARS-COV-2 RNA RESP QL NAA+PROBE: POSITIVE
SPECIMEN SOURCE: ABNORMAL

## 2021-03-10 NOTE — TELEPHONE ENCOUNTER
"-Coronavirus (COVID-19) Notification    Will reach out to pcp about her 2nd pos    Caller Name (Patient, parent, daughter/son, grandparent, etc)  Pt    Reason for call  Notify of Positive Coronavirus (COVID-19) lab results, assess symptoms,  review  [employer] recommendations    Lab Result    Lab test:  2019-nCoV rRt-PCR or SARS-CoV-2 PCR    Oropharyngeal AND/OR nasopharyngeal swabs is POSITIVE for 2019-nCoV RNA/SARS-COV-2 PCR (COVID-19 virus)    RN Recommendations/Instructions per [facility] Coronavirus COVID-19 recommendations    Brief introduction script  Introduce self then review script:  \"I am calling on behalf of [employer].  We were notified that your Coronavirus test (COVID-19) for was POSITIVE for the virus.  I have some information to relay to you but first I wanted to mention that the [place] Dept of Health will be contacting you shortly [it's possible MD already called Patient] to talk to you more about how you are feeling and other people you have had contact with who might now also have the virus.  Also,  [facility] is Partnering with the [facility] for Covid-19 research, you may be contacted directly by research staff.\"    Assessment (Inquire about Patient's current symptoms)   Assessment   Current Symptoms at time of phone call: (if no symptoms, document No symptoms] none   Symptoms onset (if applicable) n/a     If at time of call, Patients symptoms hare worsened, the Patient should contact 911 or have someone drive them to Emergency Dept promptly:      If Patient calling 911, inform 911 personal that you have tested positive for the Coronavirus (COVID-19).  Place mask on and await 911 to arrive.    If Emergency Dept, If possible, please have another adult drive you to the Emergency Dept but you need to wear mask when in contact with other people.      Monoclonal Antibody Administration    You may be eligible to receive a new treatment with a monoclonal antibody for " "preventing hospitalization in patients at high risk for complications from COVID-19.   This medication is still experimental and available on a limited basis; it is given through an IV and must be given at an infusion center. Please note that not all people who are eligible will receive the medication since it is in limited supply.     Are you interested in being considered for this medication?  No.   Does the patient fit the criteria: No    If patient qualifies based on above criteria:  \"We will contact you if you are selected to receive the medication in the next 1-2 days.   This is time sensitive and if you are not selected in the next 1-2 days, you will not receive the medication.  If you do not receive a call to schedule, you have not been selected.\"    Review information with Patient    Your result was positive. This means you have COVID-19 (coronavirus).  We have sent you a letter that reviews the information that I'll be reviewing with you now.    How can I protect others?    If you have symptoms: stay home and away from others (self-isolate) until:    You've had no fever--and no medicine that reduces fever--for 1 full day (24 hours). And       Your other symptoms have gotten better. For example, your cough or breathing has improved. And     At least 10 days have passed since your symptoms started. (If you've been told by a doctor that you have a weak immune system, wait 20 days.)     If you don't have symptoms: Stay home and away from others (self-isolate) until at least 10 days have passed since your first positive COVID-19 test. (Date test collected)    During this time:    Stay in your own room, including for meals. Use your own bathroom if you can.    Stay away from others in your home. No hugging, kissing or shaking hands. No visitors.     Don't go to work, school or anywhere else.     Clean  high touch  surfaces often (doorknobs, counters, handles, etc.). Use a household cleaning spray or wipes. " You'll find a full list on the EPA website at www.epa.gov/pesticide-registration/list-n-disinfectants-use-against-sars-cov-2.     Cover your mouth and nose with a mask, tissue or other face covering to avoid spreading germs.    Wash your hands and face often with soap and water.    Make a list of people you have been in close contact with recently, even if either of you wore a face covering.   ; Start your list from 2 days before you became ill or had a positive test.  ; Include anyone that was within 6 feet of you for a cumulative total of 15 minutes or more in 24 hours. (Example: if you sat next to Larry for 5 minutes in the morning and 10 minutes in the afternoon, then you were in close contact for 15 minutes total that day. Larry would be added to your list.)    A public health worker will call or text you. It is important that you answer. They will ask you questions about possible exposures to COVID-19, such as people you have been in direct contact with and places you have visited.    Tell the people on your list that you have COVID-19; they should stay away from others for 14 days starting from the last time they were in contact with you (unless you are told something different from a public health worker).     Caregivers in these groups are at risk for severe illness due to COVID-19:  o People 65 years and older  o People who live in a nursing home or long-term care facility  o People with chronic disease (lung, heart, cancer, diabetes, kidney, liver, immunologic)  o People who have a weakened immune system, including those who:  - Are in cancer treatment  - Take medicine that weakens the immune system, such as corticosteroids  - Had a bone marrow or organ transplant  - Have an immune deficiency  - Have poorly controlled HIV or AIDS  - Are obese (body mass index of 40 or higher)  - Smoke regularly    Caregivers should wear gloves while washing dishes, handling laundry and cleaning bedrooms and  bathrooms.    Wash and dry laundry with special caution. Don't shake dirty laundry, and use the warmest water setting you can.    If you have a weakened immune system, ask your doctor about other actions you should take.    For more tips, go to www.cdc.gov/coronavirus/2019-ncov/downloads/10Things.pdf.    You should not go back to work until you meet the guidelines above for ending your home isolation. You don't need to be retested for COVID-19 before going back to work--studies show that you won't spread the virus if it's been at least 10 days since your symptoms started (or 20 days, if you have a weak immune system).    Employers: This document serves as formal notice of your employee's medical guidelines for going back to work. They must meet the above guidelines before going back to work in person.    How can I take care of myself?    1. Get lots of rest. Drink extra fluids (unless a doctor has told you not to).    2. Take Tylenol (acetaminophen) for fever or pain. If you have liver or kidney problems, ask your family doctor if it's okay to take Tylenol.     Take either:     650 mg (two 325 mg pills) every 4 to 6 hours, or     1,000 mg (two 500 mg pills) every 8 hours as needed.     Note: Don't take more than 3,000 mg in one day. Acetaminophen is found in many medicines (both prescribed and over-the-counter medicines). Read all labels to be sure you don't take too much.    For children, check the Tylenol bottle for the right dose (based on their age or weight).    3. If you have other health problems (like cancer, heart failure, an organ transplant or severe kidney disease): Call your specialty clinic if you don't feel better in the next 2 days.    4. Know when to call 911: Emergency warning signs include:    Trouble breathing or shortness of breath    Pain or pressure in the chest that doesn't go away    Feeling confused like you haven't felt before, or not being able to wake up    Bluish-colored lips or  face    5. Sign up for [employer]. We know it's scary to hear that you have COVID-19. We want to track your symptoms to make sure you're okay over the next 2 weeks. Please look for an email from [employer]--this is a free, online program that we'll use to keep in touch. To sign up, follow the link in the email. Learn more at [email or web address].    Where can I get more information?    [facility]: [email or web address]    Coronavirus Basics: www.health.[facility].[place]./diseases/coronavirus/basics.html    What to Do If You're Sick: www.cdc.gov/coronavirus/2019-ncov/about/steps-when-sick.html    Ending Home Isolation: www.cdc.gov/coronavirus/2019-ncov/hcp/disposition-in-home-patients.html     Caring for Someone with COVID-19: www.cdc.gov/coronavirus/2019-ncov/if-you-are-sick/care-for-someone.html     [facility] clinical trials (COVID-19 research studies): clinicalaffairs.[facility]-clinical-trials     A Positive COVID-19 letter will be sent via [employer] or the mail. (Exception, no letters sent to Presurgerical/Preprocedure Patients)    Francy Mckeon

## 2021-03-11 ENCOUNTER — TELEPHONE (OUTPATIENT)
Dept: FAMILY MEDICINE | Facility: OTHER | Age: 61
End: 2021-03-11

## 2021-03-11 NOTE — TELEPHONE ENCOUNTER
I called and spoke to her. She got sick on February 22 in which she was exposed, by the 26th was sick with body aches could not  taste or smell she got tested on the 3rd of March and is now positive. She states she can now can taste and smell and she feels a lot better. She is wondering if this could be a false positive.

## 2021-03-15 ENCOUNTER — MYC MEDICAL ADVICE (OUTPATIENT)
Dept: FAMILY MEDICINE | Facility: OTHER | Age: 61
End: 2021-03-15

## 2021-03-15 NOTE — LETTER
[facility] [place]  [address]  [place] [address]  Phone: [phone]    March 17, 2021        Annamaria Baer  [address]  [place] [address]          To whom it may concern:    RE: Annamaria Yin had Covid 19 and has recovered.  She is able to travel .  Her test has remained positive. And we will not recheck for 90 days from date of illness per CDC guidelines.     Please contact me for questions or concerns.      Sincerely,        JOLENE Turcios

## 2021-03-16 NOTE — TELEPHONE ENCOUNTER
Please call patient in regards to yesterdays Jo. She needs a letter of recovery so she may travel and a copy of her positive test.

## 2021-03-17 DIAGNOSIS — Z78.9 PATIENT TRAVELS: Primary | ICD-10-CM

## 2021-03-18 NOTE — TELEPHONE ENCOUNTER
She wanted patches but it looks like tablets are covered under her insurance. Maybe I did not search correctly for the patches. Rx pended.

## 2021-03-19 RX ORDER — MECLIZINE HYDROCHLORIDE 25 MG/1
25 TABLET ORAL 3 TIMES DAILY PRN
Qty: 50 TABLET | Refills: 0 | Status: SHIPPED | OUTPATIENT
Start: 2021-03-19 | End: 2021-07-07

## 2021-06-07 ENCOUNTER — TELEPHONE (OUTPATIENT)
Dept: FAMILY MEDICINE | Facility: OTHER | Age: 61
End: 2021-06-07

## 2021-06-07 NOTE — TELEPHONE ENCOUNTER
Pt called back missed call from PCP's nurse. Call pt back  [phone]    Go to ED if needing immediate medical attention.

## 2021-06-07 NOTE — TELEPHONE ENCOUNTER
"10:15 AM    Reason for Call: OVERBOOK    Patient is having the following symptoms: \"whole body feels inflamed and I can barely walk\" for 3 days    The patient is requesting an appointment for as soon as possible with Celeste Villa.    Was an appointment offered for this call? No  If yes : Appointment type              Date    Preferred method for responding to this message: Telephone Call     What is your phone number [phone]    If we cannot reach you directly, may we leave a detailed response at the number you provided? Yes    Can this message wait until your PCP/provider returns, if unavailable today? Not applicable, provider is in    [facility]    "

## 2021-06-08 ENCOUNTER — OFFICE VISIT (OUTPATIENT)
Dept: FAMILY MEDICINE | Facility: OTHER | Age: 61
End: 2021-06-08
Attending: NURSE PRACTITIONER
Payer: COMMERCIAL

## 2021-06-08 ENCOUNTER — ANCILLARY PROCEDURE (OUTPATIENT)
Dept: GENERAL RADIOLOGY | Facility: OTHER | Age: 61
End: 2021-06-08
Attending: NURSE PRACTITIONER
Payer: COMMERCIAL

## 2021-06-08 VITALS
HEART RATE: 76 BPM | BODY MASS INDEX: 31.2 KG/M2 | DIASTOLIC BLOOD PRESSURE: 78 MMHG | RESPIRATION RATE: 18 BRPM | WEIGHT: 170.6 LBS | SYSTOLIC BLOOD PRESSURE: 132 MMHG | OXYGEN SATURATION: 98 % | TEMPERATURE: 99 F

## 2021-06-08 DIAGNOSIS — M25.50 MULTIPLE JOINT PAIN: ICD-10-CM

## 2021-06-08 DIAGNOSIS — R20.2 NUMBNESS AND TINGLING OF BOTH FEET: ICD-10-CM

## 2021-06-08 DIAGNOSIS — M25.50 MULTIPLE JOINT PAIN: Primary | ICD-10-CM

## 2021-06-08 DIAGNOSIS — R20.0 NUMBNESS AND TINGLING OF BOTH FEET: ICD-10-CM

## 2021-06-08 LAB
ALBUMIN SERPL-MCNC: 3.6 G/DL (ref 3.4–5)
ALP SERPL-CCNC: 124 U/L (ref 40–150)
ALT SERPL W P-5'-P-CCNC: 34 U/L (ref 0–50)
ANION GAP SERPL CALCULATED.3IONS-SCNC: 5 MMOL/L (ref 3–14)
AST SERPL W P-5'-P-CCNC: 21 U/L (ref 0–45)
BASOPHILS # BLD AUTO: 0 10E9/L (ref 0–0.2)
BASOPHILS NFR BLD AUTO: 0.4 %
BILIRUB SERPL-MCNC: 0.5 MG/DL (ref 0.2–1.3)
BUN SERPL-MCNC: 10 MG/DL (ref 7–30)
CALCIUM SERPL-MCNC: 9.8 MG/DL (ref 8.5–10.1)
CHLORIDE SERPL-SCNC: 108 MMOL/L (ref 94–109)
CO2 SERPL-SCNC: 26 MMOL/L (ref 20–32)
CREAT SERPL-MCNC: 0.6 MG/DL (ref 0.52–1.04)
CRP SERPL-MCNC: 36 MG/L (ref 0–8)
DIFFERENTIAL METHOD BLD: NORMAL
EOSINOPHIL # BLD AUTO: 0.1 10E9/L (ref 0–0.7)
EOSINOPHIL NFR BLD AUTO: 1 %
ERYTHROCYTE [DISTWIDTH] IN BLOOD BY AUTOMATED COUNT: 12.6 % (ref 10–15)
ERYTHROCYTE [SEDIMENTATION RATE] IN BLOOD BY WESTERGREN METHOD: 32 MM/H (ref 0–30)
EST. AVERAGE GLUCOSE BLD GHB EST-MCNC: 117 MG/DL
GFR SERPL CREATININE-BSD FRML MDRD: >90 ML/MIN/{1.73_M2}
GLUCOSE SERPL-MCNC: 100 MG/DL (ref 70–99)
HBA1C MFR BLD: 5.7 % (ref 0–5.6)
HCT VFR BLD AUTO: 43.3 % (ref 35–47)
HGB BLD-MCNC: 14.9 G/DL (ref 11.7–15.7)
IMM GRANULOCYTES # BLD: 0 10E9/L (ref 0–0.4)
IMM GRANULOCYTES NFR BLD: 0.3 %
LYMPHOCYTES # BLD AUTO: 3.1 10E9/L (ref 0.8–5.3)
LYMPHOCYTES NFR BLD AUTO: 29.2 %
MAGNESIUM SERPL-MCNC: 2.5 MG/DL (ref 1.6–2.3)
MCH RBC QN AUTO: 30.7 PG (ref 26.5–33)
MCHC RBC AUTO-ENTMCNC: 34.4 G/DL (ref 31.5–36.5)
MCV RBC AUTO: 89 FL (ref 78–100)
MONOCYTES # BLD AUTO: 0.9 10E9/L (ref 0–1.3)
MONOCYTES NFR BLD AUTO: 8 %
NEUTROPHILS # BLD AUTO: 6.5 10E9/L (ref 1.6–8.3)
NEUTROPHILS NFR BLD AUTO: 61.1 %
NRBC # BLD AUTO: 0 10*3/UL
NRBC BLD AUTO-RTO: 0 /100
PLATELET # BLD AUTO: 277 10E9/L (ref 150–450)
POTASSIUM SERPL-SCNC: 3.5 MMOL/L (ref 3.4–5.3)
PROT SERPL-MCNC: 8.4 G/DL (ref 6.8–8.8)
RBC # BLD AUTO: 4.86 10E12/L (ref 3.8–5.2)
SODIUM SERPL-SCNC: 139 MMOL/L (ref 133–144)
TSH SERPL DL<=0.005 MIU/L-ACNC: 1.35 MU/L (ref 0.4–4)
WBC # BLD AUTO: 10.7 10E9/L (ref 4–11)

## 2021-06-08 PROCEDURE — 36415 COLL VENOUS BLD VENIPUNCTURE: CPT | Performed by: NURSE PRACTITIONER

## 2021-06-08 PROCEDURE — 82746 ASSAY OF FOLIC ACID SERUM: CPT | Performed by: NURSE PRACTITIONER

## 2021-06-08 PROCEDURE — 99214 OFFICE O/P EST MOD 30 MIN: CPT | Performed by: NURSE PRACTITIONER

## 2021-06-08 PROCEDURE — 73523 X-RAY EXAM HIPS BI 5/> VIEWS: CPT | Mod: TC | Performed by: RADIOLOGY

## 2021-06-08 PROCEDURE — 86038 ANTINUCLEAR ANTIBODIES: CPT | Performed by: NURSE PRACTITIONER

## 2021-06-08 PROCEDURE — 87207 SMEAR SPECIAL STAIN: CPT | Mod: TC | Performed by: NURSE PRACTITIONER

## 2021-06-08 PROCEDURE — 83036 HEMOGLOBIN GLYCOSYLATED A1C: CPT | Performed by: NURSE PRACTITIONER

## 2021-06-08 PROCEDURE — 86140 C-REACTIVE PROTEIN: CPT | Performed by: NURSE PRACTITIONER

## 2021-06-08 PROCEDURE — 85652 RBC SED RATE AUTOMATED: CPT | Performed by: NURSE PRACTITIONER

## 2021-06-08 PROCEDURE — 86666 EHRLICHIA ANTIBODY: CPT | Mod: 90 | Performed by: NURSE PRACTITIONER

## 2021-06-08 PROCEDURE — 86618 LYME DISEASE ANTIBODY: CPT | Performed by: NURSE PRACTITIONER

## 2021-06-08 PROCEDURE — 80050 GENERAL HEALTH PANEL: CPT | Performed by: NURSE PRACTITIONER

## 2021-06-08 PROCEDURE — 99N1182 PR STATISTIC ESTIMATED AVERAGE GLUCOSE: Performed by: NURSE PRACTITIONER

## 2021-06-08 PROCEDURE — 86431 RHEUMATOID FACTOR QUANT: CPT | Performed by: NURSE PRACTITIONER

## 2021-06-08 PROCEDURE — 82607 VITAMIN B-12: CPT | Performed by: NURSE PRACTITIONER

## 2021-06-08 PROCEDURE — 83735 ASSAY OF MAGNESIUM: CPT | Performed by: NURSE PRACTITIONER

## 2021-06-08 PROCEDURE — 87015 SPECIMEN INFECT AGNT CONCNTJ: CPT | Performed by: NURSE PRACTITIONER

## 2021-06-08 RX ORDER — METHYLPREDNISOLONE 4 MG
TABLET, DOSE PACK ORAL
Qty: 21 TABLET | Refills: 0 | Status: SHIPPED | OUTPATIENT
Start: 2021-06-08 | End: 2021-06-15

## 2021-06-08 RX ORDER — GABAPENTIN 300 MG/1
300 CAPSULE ORAL AT BEDTIME
Qty: 30 CAPSULE | Refills: 1 | Status: SHIPPED | OUTPATIENT
Start: 2021-06-08 | End: 2021-07-08

## 2021-06-08 ASSESSMENT — PAIN SCALES - GENERAL: PAINLEVEL: SEVERE PAIN (7)

## 2021-06-08 NOTE — PROGRESS NOTES
"    Assessment & Plan     Labs pending. Treat for arthritis pain. Treat for neuropathy like symptoms in feet with Neurontin. No red flags on exam.     (M25.50) Multiple joint pain  (primary encounter diagnosis)  Comment: check labs and hip XRAYS  Plan: CBC with platelets and differential,         Comprehensive metabolic panel, TSH with free T4        reflex, CRP, inflammation, ESR: Erythrocyte         sedimentation rate, Lyme Disease Pebbles with         reflex to WB Serum, Anaplasma phagocytoph         antibody IgG IgM, Parasite stain, Rheumatoid         factor, Anti Nuclear Pebbles IgG by IFA with         Reflex, methylPREDNISolone (MEDROL DOSEPAK) 4         MG tablet therapy pack, XR Pelvis and Hip         Bilateral 2 Views (Clinic Performed), Estimated        Average Glucose            (R20.0,  R20.2) Numbness and tingling of both feet  Comment: check labs  Plan: Vitamin B12, Folate, Magnesium, gabapentin         (NEURONTIN) 300 MG capsule, Hemoglobin A1c             BMI:   Estimated body mass index is 31.2 kg/m  as calculated from the following:    Height as of 9/3/20: 1.575 m (5' 2\").    Weight as of this encounter: 77.4 kg (170 lb 9.6 oz).   Weight management plan: Discussed healthy diet and exercise guidelines    See Patient Instructions    Return in about 1 month (around 7/8/2021) for Follow up joint pain and neuropathy .    Phoebe Gaitan NP  [facility] - [place]   Annamaria is a 60 year old who presents for the following health issues     HPI   Musculoskeletal problem/pain      Duration: Two weeks     Description  Location: Pads of feet, right foot swelling and tenderness, bursitis in right hip, bilateral hip aches, neck pain, bilateral hand pain     Intensity:  moderate, severe, intermittently     Accompanying signs and symptoms: numbness, tingling and swelling    History  Previous similar problem: YES- Hx of hip pain   Previous evaluation:  x-ray at [facility]     Precipitating or " alleviating factors:  Trauma or overuse: no   Aggravating factors include: standing, walking, climbing stairs, lifting, exercise and overuse    Therapies tried and outcome: NSAID - Alleve    Numbness and tingling in feet    Denies fever, chills, or night sweats    Eating and drinking well    Review of Systems   Constitutional, HEENT, cardiovascular, pulmonary, GI, , musculoskeletal, neuro, skin, endocrine and psych systems are negative, except as otherwise noted.      Objective    /78   Pulse 76   Temp 99  F (37.2  C)   Resp 18   Wt 77.4 kg (170 lb 9.6 oz)   SpO2 98%   BMI 31.20 kg/m    Body mass index is 31.2 kg/m .  Physical Exam   GENERAL: healthy, alert and no distress  NECK: no adenopathy, no asymmetry, masses, or scars and thyroid normal to palpation  RESP: lungs clear to auscultation - no rales, rhonchi or wheezes  CV: regular rate and rhythm, normal S1 S2, no S3 or S4, no murmur, click or rub, no peripheral edema and peripheral pulses strong  MS: no gross musculoskeletal defects noted, no edema. No step offs or TTP to spinal column. No rash, erythema, bruising, or warmth to the touch to posterior back  SKIN: no suspicious lesions or rashes  NEURO: Normal strength and tone, mentation intact and speech normal  PSYCH: mentation appears normal, affect normal/bright    Results for orders placed or performed in visit on 06/08/21   XR Pelvis and Hip Bilateral 2 Views (Clinic Performed)     Status: None    Narrative    PROCEDURE: XR PELVIS AND HIP BILATERAL 2 VIEWS 6/8/2021 2:25 PM    HISTORY: Multiple joint pain    COMPARISONS: None.    TECHNIQUE: Pelvis one view, right hip 2 views, left hip 2 views,    FINDINGS: Pelvis: The pelvis is intact. The sacrum and sacroiliac  joints appear normal.    Right hip: There is subchondral cystic change seen in the acetabulum.  Degenerative osteophytes are noted. The proximal femur is intact.    Left hip: There is subchondral cystic change seen in the  acetabulum.  Degenerative osteophytes are noted at the hip joint.         Impression    IMPRESSION: Osteoarthritic changes at both hips.    VIKTORIA MIXON MD   Results for orders placed or performed in visit on 06/08/21   CBC with platelets and differential     Status: None   Result Value Ref Range    WBC 10.7 4.0 - 11.0 10e9/L    RBC Count 4.86 3.8 - 5.2 10e12/L    Hemoglobin 14.9 11.7 - 15.7 g/dL    Hematocrit 43.3 35.0 - 47.0 %    MCV 89 78 - 100 fl    MCH 30.7 26.5 - 33.0 pg    MCHC 34.4 31.5 - 36.5 g/dL    RDW 12.6 10.0 - 15.0 %    Platelet Count 277 150 - 450 10e9/L    Diff Method Automated Method     % Neutrophils 61.1 %    % Lymphocytes 29.2 %    % Monocytes 8.0 %    % Eosinophils 1.0 %    % Basophils 0.4 %    % Immature Granulocytes 0.3 %    Nucleated RBCs 0 0 /100    Absolute Neutrophil 6.5 1.6 - 8.3 10e9/L    Absolute Lymphocytes 3.1 0.8 - 5.3 10e9/L    Absolute Monocytes 0.9 0.0 - 1.3 10e9/L    Absolute Eosinophils 0.1 0.0 - 0.7 10e9/L    Absolute Basophils 0.0 0.0 - 0.2 10e9/L    Abs Immature Granulocytes 0.0 0 - 0.4 10e9/L    Absolute Nucleated RBC 0.0    Comprehensive metabolic panel     Status: Abnormal   Result Value Ref Range    Sodium 139 133 - 144 mmol/L    Potassium 3.5 3.4 - 5.3 mmol/L    Chloride 108 94 - 109 mmol/L    Carbon Dioxide 26 20 - 32 mmol/L    Anion Gap 5 3 - 14 mmol/L    Glucose 100 (H) 70 - 99 mg/dL    Urea Nitrogen 10 7 - 30 mg/dL    Creatinine 0.60 0.52 - 1.04 mg/dL    GFR Estimate >90 >60 mL/min/[1.73_m2]    GFR Estimate If Black >90 >60 mL/min/[1.73_m2]    Calcium 9.8 8.5 - 10.1 mg/dL    Bilirubin Total 0.5 0.2 - 1.3 mg/dL    Albumin 3.6 3.4 - 5.0 g/dL    Protein Total 8.4 6.8 - 8.8 g/dL    Alkaline Phosphatase 124 40 - 150 U/L    ALT 34 0 - 50 U/L    AST 21 0 - 45 U/L   TSH with free T4 reflex     Status: None   Result Value Ref Range    TSH 1.35 0.40 - 4.00 mU/L   CRP, inflammation     Status: Abnormal   Result Value Ref Range    CRP Inflammation 36.0 (H) 0.0 - 8.0 mg/L    ESR: Erythrocyte sedimentation rate     Status: Abnormal   Result Value Ref Range    Sed Rate 32 (H) 0 - 30 mm/h   Magnesium     Status: Abnormal   Result Value Ref Range    Magnesium 2.5 (H) 1.6 - 2.3 mg/dL   Hemoglobin A1c     Status: Abnormal   Result Value Ref Range    Hemoglobin A1C 5.7 (H) 0 - 5.6 %   Estimated Average Glucose     Status: None   Result Value Ref Range    Estimated Average Glucose 117 mg/dL

## 2021-06-08 NOTE — NURSING NOTE
"Chief Complaint   Patient presents with     Musculoskeletal Problem       Initial /78   Pulse 76   Temp 99  F (37.2  C)   Resp 18   Wt 77.4 kg (170 lb 9.6 oz)   SpO2 98%   BMI 31.20 kg/m   Estimated body mass index is 31.2 kg/m  as calculated from the following:    Height as of 9/3/20: 1.575 m (5' 2\").    Weight as of this encounter: 77.4 kg (170 lb 9.6 oz).  Medication Reconciliation: elder Quiroga  "

## 2021-06-08 NOTE — PATIENT INSTRUCTIONS
Patient Education     Arthralgia    Arthralgia is the term for pain in or around the joint. It is a symptom, not a disease. This pain may involve one or more joints. In some cases, the pain moves from joint to joint.  There are many causes for joint pain. These include:    Injury    Wearing out the joint surface (osteoarthritis)    Inflammation of the joint because of crystals in the joint fluid (gout)    Infection inside the joint      Inflammation of the fluid-filled sacs around the joint (bursitis)    Autoimmune disorders such as rheumatoid arthritis or lupus    Inflammation of chords that attach muscle to bone (tendonitis)  Home care    Rest the involved joint(s) until your symptoms improve.     Eat a healthy diet, exercise as advised by your healthcare provider and stay at a healthy weight    You may be prescribed pain medicine. If none is prescribed, you may use acetaminophen or ibuprofen to control pain and inflammation.    Follow-up care  Follow up with your healthcare provider or as advised.  When to seek medical advice  Call your healthcare provider right away if any of the following occurs:    Pain, swelling, or redness of joint increases    Pain worsens or recurs after a period of improvement    Pain moves to other joints    You cannot bear weight on the affected joint     You cannot move the affected joint    Joint appears deformed    New rash appears    Fever of 100.4 F (38 C) or higher, or as directed by your healthcare provider    New symptoms appear  [employer] last reviewed this educational content on 8/1/2019 2000-2021 [employer]. All rights reserved. This information is not intended as a substitute for professional medical care. Always follow your healthcare professional's instructions.    Patient Education     Peripheral Neuropathy  Peripheral neuropathy is the result of damage to the peripheral nerves. It usually affects the arms or legs, and causes a change in physical feeling.  Sometimes it causes weakness in the muscles. You may feel tingling, numbness or shooting pains. Symptoms may be more common at night. Skin may be extra sensitive to light touch or temperature changes.  Neuropathy may be caused by a complication of a chronic disease such as diabetes, virus or bacterial infections, or physical injury. A ruptured disk with pressure on the spinal nerve may also lead to the problem. Certain vitamin deficiencies may also lead to it. It may also be caused by exposure to certain drugs or chemicals.  Home care    Tell the healthcare provider about all medicines you take. This includes prescription and over-the-counter medicines, vitamins, and herbs. Ask if any of the medicines may be causing your problems. Don't make any changes to prescription medicines without talking to your healthcare provider first.    You may be prescribed medicines to help relieve the tingling feeling or for pain. Take all medicines as directed.    A numb hand or foot may be more prone to injury. To help protect it:  ? Always use oven mitts.  ? Test water with an unaffected hand or foot.  ? Use caution when trimming nails. File sharp areas.  ? Wear shoes that fit well to avoid pressure points, blisters, and ulcers.  ? Inspect your hands and feet carefully (including the soles of your feet and between your toes) at least once a week. If you see red areas, sores, or other problems, tell your healthcare provider.    Follow-up care  Follow up with your doctor or as advised by our staff. You may need further testing or evaluation.  When to seek medical advice  Call your healthcare provider right away if any of the following occur:    Redness, swelling, cracking, or ulcer on any numb area, especially the feet    New symptoms of numbness or muscle weakness numbness    Loss of bowel or bladder control    Slurred speech, confusion, or trouble speaking, walking, or seeing  [facility] last reviewed this educational content on  3/1/2018    5508-2915 [employer]. All rights reserved. This information is not intended as a substitute for professional medical care. Always follow your healthcare professional's instructions.

## 2021-06-09 LAB
FOLATE SERPL-MCNC: 21.8 NG/ML
VIT B12 SERPL-MCNC: 360 PG/ML (ref 193–986)

## 2021-06-10 DIAGNOSIS — M05.80 POLYARTHRITIS WITH POSITIVE RHEUMATOID FACTOR (H): Primary | ICD-10-CM

## 2021-06-10 DIAGNOSIS — M08.09 POLYARTICULAR RF POSITIVE JIA (JUVENILE IDIOPATHIC ARTHRITIS) (H): ICD-10-CM

## 2021-06-10 DIAGNOSIS — M25.50 MULTIPLE JOINT PAIN: ICD-10-CM

## 2021-06-10 LAB
ANA SER QL IF: NEGATIVE
B BURGDOR IGG+IGM SER QL: 0.04 (ref 0–0.89)
PARASITE SPEC INSPECT: NORMAL
RHEUMATOID FACT SER NEPH-ACNC: 18 IU/ML (ref 0–20)
SPECIMEN SOURCE: NORMAL

## 2021-06-12 LAB
A PHAGOCYTOPH IGG TITR SER IF: NORMAL {TITER}
A PHAGOCYTOPH IGM TITR SER IF: NORMAL {TITER}

## 2021-06-15 DIAGNOSIS — M25.50 MULTIPLE JOINT PAIN: ICD-10-CM

## 2021-06-15 RX ORDER — METHYLPREDNISOLONE 4 MG
TABLET, DOSE PACK ORAL
Qty: 21 TABLET | Refills: 0 | Status: SHIPPED | OUTPATIENT
Start: 2021-06-15 | End: 2021-07-07

## 2021-06-15 NOTE — TELEPHONE ENCOUNTER
Patient states she took her last pill of prednisone yesterday and today the pain from the RA is really strong, her neck, feet, hand.  She was given prednisone last week from provider and she is requesting more.  Patient has an appointment with the rheumatologist on 8/24 in [place]. Provider that prescribed was Kandy, both her and [place] are out today, please advise and let her know.        methylPREDNISolone (MEDROL DOSEPAK) 4 MG tablet therapy pack      Last Written Prescription Date:  6/8/21  Last Fill Quantity: 21,   # refills: 0  Last Office Visit: 6/8/21  Future Office visit:    Next 5 appointments (look out 90 days)    Jul 29, 2021  1:45 PM  (Arrive by 1:30 PM)  SHORT with Phoebe Gaitan NP  [facility] - [place] ([facility] [place] ) [address] [place] [address]  [phone]           Routing refill request to provider for review/approval because:  Drug not on the G, P or [facility] refill protocol or controlled substance

## 2021-06-17 NOTE — TELEPHONE ENCOUNTER
Patient has a telephone appointment Tuesday the 22nd.  Is this OK or does [facility] want to see her sooner.  Dr Odonnell is advising for her to be seen this week or Monday by provider.

## 2021-06-21 ENCOUNTER — VIRTUAL VISIT (OUTPATIENT)
Dept: FAMILY MEDICINE | Facility: OTHER | Age: 61
End: 2021-06-21
Attending: PHYSICIAN ASSISTANT
Payer: COMMERCIAL

## 2021-06-21 DIAGNOSIS — M05.741 RHEUMATOID ARTHRITIS INVOLVING BOTH HANDS WITH POSITIVE RHEUMATOID FACTOR (H): Primary | ICD-10-CM

## 2021-06-21 DIAGNOSIS — M05.742 RHEUMATOID ARTHRITIS INVOLVING BOTH HANDS WITH POSITIVE RHEUMATOID FACTOR (H): Primary | ICD-10-CM

## 2021-06-21 PROCEDURE — 99214 OFFICE O/P EST MOD 30 MIN: CPT | Mod: 95 | Performed by: PHYSICIAN ASSISTANT

## 2021-06-21 RX ORDER — PREDNISONE 5 MG/1
TABLET ORAL
Qty: 70 TABLET | Refills: 4 | Status: SHIPPED | OUTPATIENT
Start: 2021-06-21 | End: 2021-06-28

## 2021-06-21 NOTE — PROGRESS NOTES
Annamaria is a 60 year old who is being evaluated via a billable telephone visit.      What phone number would you like to be contacted at? [phone]  How would you like to obtain your AVS? MyChart    Assessment & Plan     Rheumatoid arthritis involving both hands with positive rheumatoid factor (H)  She is crying in pain, can't move her hands and has large effusion this morning after her prednisone wore off. We will keep her on low dose prednisone until she sees Rheumatology. Also try [place] for referral nothing here until end of August. She can't stand the pain and wants to be seen in a more efficient time frame, which I do not balme her. I am not the one who originally saw her but am her primary and feel uncomfortable with Methotrexate scripting without a Rheumatologist on a primary diagnosis.   Hope the 5 mg of prednisone give her relief, if not we will then do a phone consult with the [place].   - predniSONE (DELTASONE) 5 MG tablet; Take two pills by mouth for 2 days then one pill by mouth there after.  - RHEUMATOLOGY REFERRAL; Future    Review of external notes as documented elsewhere in note  5 minutes spent on the date of the encounter doing chart review        See Patient Instructions    No follow-ups on file.    JOLENE Turcios  [facility] - [place]    Subjective   Annamaria is a 60 year old who presents for the following health issues pain and swelling in wrists    HPI     Concern - Arthritis   Onset:mutliple join pain  Description:get down to 1 pill with prednisone taper the same symptoms come back with the joint pain by night fall. Left neck and arm with excruciating pain. Have problems sleeping last night  Intensity: severe, 10/10  Progression of Symptoms:  worsening  Accompanying Signs & Symptoms: same  Previous history of similar problem: same  Precipitating factors:        Worsened by: same  Alleviating factors:        Improved by: none  Therapies tried and outcome: saw Phoebe  Elfegohelio on 6/8/2021 referred her to see a rheumatologist not until August 24th         Review of Systems   Constitutional, HEENT, cardiovascular, pulmonary, gi and gu systems are negative, except as otherwise noted.      Objective           Vitals:  No vitals were obtained today due to virtual visit.    Physical Exam   healthy, alert, moderate distress and fatigued  PSYCH: Alert and oriented times 3; coherent speech, normal   rate and volume, able to articulate logical thoughts, able   to abstract reason, no tangential thoughts, no hallucinations   or delusions  Her affect is tearful, sad and in pain   RESP: No cough, no audible wheezing, able to talk in full sentences  Remainder of exam unable to be completed due to telephone visits    Office Visit on 06/08/2021   Component Date Value Ref Range Status     WBC 06/08/2021 10.7  4.0 - 11.0 10e9/L Final     RBC Count 06/08/2021 4.86  3.8 - 5.2 10e12/L Final     Hemoglobin 06/08/2021 14.9  11.7 - 15.7 g/dL Final     Hematocrit 06/08/2021 43.3  35.0 - 47.0 % Final     MCV 06/08/2021 89  78 - 100 fl Final     MCH 06/08/2021 30.7  26.5 - 33.0 pg Final     MCHC 06/08/2021 34.4  31.5 - 36.5 g/dL Final     RDW 06/08/2021 12.6  10.0 - 15.0 % Final     Platelet Count 06/08/2021 277  150 - 450 10e9/L Final     Diff Method 06/08/2021 Automated Method   Final     % Neutrophils 06/08/2021 61.1  % Final     % Lymphocytes 06/08/2021 29.2  % Final     % Monocytes 06/08/2021 8.0  % Final     % Eosinophils 06/08/2021 1.0  % Final     % Basophils 06/08/2021 0.4  % Final     % Immature Granulocytes 06/08/2021 0.3  % Final     Nucleated RBCs 06/08/2021 0  0 /100 Final     Absolute Neutrophil 06/08/2021 6.5  1.6 - 8.3 10e9/L Final     Absolute Lymphocytes 06/08/2021 3.1  0.8 - 5.3 10e9/L Final     Absolute Monocytes 06/08/2021 0.9  0.0 - 1.3 10e9/L Final     Absolute Eosinophils 06/08/2021 0.1  0.0 - 0.7 10e9/L Final     Absolute Basophils 06/08/2021 0.0  0.0 - 0.2 10e9/L Final     Abs  Immature Granulocytes 06/08/2021 0.0  0 - 0.4 10e9/L Final     Absolute Nucleated RBC 06/08/2021 0.0   Final     Sodium 06/08/2021 139  133 - 144 mmol/L Final     Potassium 06/08/2021 3.5  3.4 - 5.3 mmol/L Final     Chloride 06/08/2021 108  94 - 109 mmol/L Final     Carbon Dioxide 06/08/2021 26  20 - 32 mmol/L Final     Anion Gap 06/08/2021 5  3 - 14 mmol/L Final     Glucose 06/08/2021 100* 70 - 99 mg/dL Final     Urea Nitrogen 06/08/2021 10  7 - 30 mg/dL Final     Creatinine 06/08/2021 0.60  0.52 - 1.04 mg/dL Final     GFR Estimate 06/08/2021 >90  >60 mL/min/[1.73_m2] Final    Comment: Non  GFR Calc  Starting 12/18/2018, serum creatinine based estimated GFR (eGFR) will be   calculated using the Chronic Kidney Disease Epidemiology Collaboration   (CKD-EPI) equation.       GFR Estimate If Black 06/08/2021 >90  >60 mL/min/[1.73_m2] Final    Comment:  GFR Calc  Starting 12/18/2018, serum creatinine based estimated GFR (eGFR) will be   calculated using the Chronic Kidney Disease Epidemiology Collaboration   (CKD-EPI) equation.       Calcium 06/08/2021 9.8  8.5 - 10.1 mg/dL Final     Bilirubin Total 06/08/2021 0.5  0.2 - 1.3 mg/dL Final     Albumin 06/08/2021 3.6  3.4 - 5.0 g/dL Final     Protein Total 06/08/2021 8.4  6.8 - 8.8 g/dL Final     Alkaline Phosphatase 06/08/2021 124  40 - 150 U/L Final     ALT 06/08/2021 34  0 - 50 U/L Final     AST 06/08/2021 21  0 - 45 U/L Final     TSH 06/08/2021 1.35  0.40 - 4.00 mU/L Final     CRP Inflammation 06/08/2021 36.0* 0.0 - 8.0 mg/L Final     Sed Rate 06/08/2021 32* 0 - 30 mm/h Final     Lyme Disease Antibodies Serum 06/08/2021 0.04  0.00 - 0.89 Final    Comment: Negative, Absence of detectable Borrelia burdorferi antibodies. A negative   result does not exclude the possibility of Borrelia burgdorferi infection. If   early Lyme disease is suspected, a second sample should be collected and   tested 2 to 4 weeks later.       A Phagocytophil IgG  06/08/2021 <1:80  <1:80 Final    Comment: (Note)  INTERPRETIVE INFORMATION: A. phagocytophilum (HGA)   Antibody, IgG   Less than 1:80 - No significant level of IgG antibodies                    to A. phagocytophilum detected.   Greater than or   equal to 1:80  - Suggestive of a recent or past infection                    with A. phagocytophilum.  This test was developed and its performance characteristics   determined by [facility]. It has not been cleared or   approved by the US Food and Drug Administration. This test   was performed in a CLIA certified laboratory and is   intended for clinical purposes.       A Phagocytophil IgM 06/08/2021 < 1:16  <1:16 Final    Comment: (Note)  INTERPRETIVE INFORMATION: A. phagocytophilum (HGA)   Antibody, IgM   Less than 1:16 - No significant level of IgM antibodies                    to A. phagocytophilum detected.   Greater than or   equal to 1:16  - Suggestive of a current or recent                    infection with A. phagocytophilum.  This test was developed and its performance characteristics   determined by [facility]. It has not been cleared or   approved by the US Food and Drug Administration. This test   was performed in a CLIA certified laboratory and is   intended for clinical purposes.  Performed By: [facility]  [address] [place] [id number]  : Rosalind Quintanilla MD       Specimen Description 06/08/2021 Blood   Final     Parasite Stain 06/08/2021 No Babesia found   Final     Parasite Stain 06/08/2021 No Anaplasma phagocytophilum or Ehrlichia spp. found   Final     Parasite Stain 06/08/2021    Final                    Value:Giemsa stained thin and thick smears reveal no inclusions of Anaplasma phagocytophilum or   Ehrlichia spp. and no Babesia species.       Parasite Stain 06/08/2021    Final                    Value:Yazmin Shultz M.D., Medical Director  6/10/21       Rheumatoid Factor 06/08/2021 18* <12 IU/mL  Final     KYMBERLY interpretation 06/08/2021 Negative  NEG^Negative Final    Comment:                                    Reference range:  <1:40  NEGATIVE  1:40 - 1:80  BORDERLINE POSITIVE  >1:80 POSITIVE       Vitamin B12 06/08/2021 360  193 - 986 pg/mL Final     Folate 06/08/2021 21.8  >5.4 ng/mL Final     Magnesium 06/08/2021 2.5* 1.6 - 2.3 mg/dL Final     Hemoglobin A1C 06/08/2021 5.7* 0 - 5.6 % Final    Comment: Normal <5.7% Prediabetes 5.7-6.4%  Diabetes 6.5% or higher - adopted from ADA   consensus guidelines.       Estimated Average Glucose 06/08/2021 117  mg/dL Final           Phone call duration: 12  minutes

## 2021-06-22 ENCOUNTER — TELEPHONE (OUTPATIENT)
Dept: FAMILY MEDICINE | Facility: OTHER | Age: 61
End: 2021-06-22

## 2021-06-22 DIAGNOSIS — M05.741 RHEUMATOID ARTHRITIS INVOLVING BOTH HANDS WITH POSITIVE RHEUMATOID FACTOR (H): ICD-10-CM

## 2021-06-22 DIAGNOSIS — M05.742 RHEUMATOID ARTHRITIS INVOLVING BOTH HANDS WITH POSITIVE RHEUMATOID FACTOR (H): ICD-10-CM

## 2021-06-22 NOTE — TELEPHONE ENCOUNTER
Patient called stating that she took the 2 pills at 6 of prednisone last night and it did not touch the pain so she took 3 more pills every 2 hours starting at 8 or so and she was able to fall asleep.  Patient took 2 more pills this morning, she is not able to use her right arm because of the rheumatoid arthritis.  Yesterday morning she said the arthritis was in her left wrist and as the day went on it had moved to her right shoulder and is very painful. Patient would like to know how many pills of the prednisone she can take in a day.  Can patient get something else for the pain since the prednisone is not working for her.    predniSONE (DELTASONE) 5 MG tablet      Last Written Prescription Date:  6/21/21  Last Fill Quantity: 70,   # refills: 4  Last Office Visit: 6/21/20  Future Office visit:    Next 5 appointments (look out 90 days)    Jul 29, 2021  1:45 PM  (Arrive by 1:30 PM)  SHORT with Phoebe Gaitan NP  [facility] [place] ([facility] [place] ) [address]  [place] [address]  [phone]           Routing refill request to provider for review/approval because:  Drug not on the G, P or  Health refill protocol or controlled substance

## 2021-06-23 ENCOUNTER — TELEPHONE (OUTPATIENT)
Dept: FAMILY MEDICINE | Facility: OTHER | Age: 61
End: 2021-06-23

## 2021-06-23 NOTE — TELEPHONE ENCOUNTER
She needs a phone visit by her statement I can't tell how much she took.   The question is not how much can she take this can cause big problems I withdrawal if you do it wrong.  So we need to come to agreement on this. Was on 24 mg. Before and sounds to me like she took at least 60 .  Would rather do a different taper. She will become raging and cushingoid (moon face, weight gain Striae)  with what she is doing. So do 40 mg a day then after a week do 35 mg a day, then another week 30 mg and so on.

## 2021-06-28 RX ORDER — PREDNISONE 5 MG/1
TABLET ORAL
Qty: 196 TABLET | Refills: 0 | Status: SHIPPED | OUTPATIENT
Start: 2021-06-30 | End: 2021-08-18

## 2021-06-28 RX ORDER — PREDNISONE 5 MG/1
40 TABLET ORAL ONCE
Qty: 8 TABLET | Refills: 0 | Status: SHIPPED | OUTPATIENT
Start: 2021-06-28 | End: 2021-06-28

## 2021-06-28 NOTE — TELEPHONE ENCOUNTER
"Pt called, needs refill of prednisone. Will complete one week of 40mg tomorrow. Pended for tapering dose. Pt requesting 5mg tabs \"because then I spread them out and take them throughout the day.\"  Please advise. Thank you!    "

## 2021-07-06 ENCOUNTER — NURSE TRIAGE (OUTPATIENT)
Dept: FAMILY MEDICINE | Facility: OTHER | Age: 61
End: 2021-07-06

## 2021-07-06 DIAGNOSIS — K27.9 PUD (PEPTIC ULCER DISEASE): Primary | ICD-10-CM

## 2021-07-06 NOTE — TELEPHONE ENCOUNTER
"Protocol advises patient to be seen within 3 days for wrist swelling and pain. Patient states the swelling starts when her prednisone starts weaning off and once she takes the next dose the swelling goes down. States this has been going on since she decreased from 40 mg to 35 mg a week ago. Patient would like to know if she should continue taking prednisone how she is. States she is still taking 7 tablets daily until tomorrow when she goes down to 6 tablets daily. Patient denies difficulty breathing, chest pain, fever, vomiting, diarrhea, weakness. Patient states it is \"very achey\" and has some redness, about 2 inches, that is warm to the touch, and on and off numbness and tingling. Patient is aware that PCP is not in today. Routing message to covering provider to advice. Please advise, thank you.    Patient's phone number is [phone]    Reason for Disposition    MILD OR MODERATE joint swelling (e.g., feels or looks mildly swollen or puffy)    Additional Information    Negative: Sounds like a life-threatening emergency to the triager    Negative: Followed a hand or wrist injury    Negative: Wrist pain is main symptom    Negative: Small area of SKIN ONLY swelling (localized) and followed a bee, wasp, or yellow jacket sting to the area    Negative: Small area of SKIN ONLY swelling (localized) followed an insect bite to the area    Negative: Arm swelling is main symptom    Negative: Hand swelling is main symptom    Negative: Cast problems or questions    Negative: Pain, redness, swelling, or pus at IV Site    Negative: [1] Wrist pain AND [2] fever    Negative: [1] Wrist redness AND [2] fever    Negative: [1] Red area or streak AND [2] fever    Negative: Patient sounds very sick or weak to the triager    Negative: [1] SEVERE pain (e.g., excruciating, unable to use hand or wrist at all) AND [2] not improved after 2 hours of pain medicine    Negative: [1] Red area or streak [2] large (> 2 in. or 5 cm)    Negative: [1] " "Looks infected (spreading redness, pus) AND [2] large red area (> 2 in. or 5 cm)    Negative: SEVERE joint swelling (e.g., can barely bend or move wrist joint)    Negative: Looks like a boil, infected sore, deep ulcer or other infected rash (spreading redness, pus)    Answer Assessment - Initial Assessment Questions  1. ONSET: \"When did the swelling start?\" (e.g., minutes, hours, days, weeks)      Last Thursday   2. LOCATION: \"What part of the wrist is swollen?\"  \"Are both wrists swollen or just one wrist?\"      Entire left wrist. A little bit on the right. Nothing I can complain about.  3. SEVERITY: \"How bad is the swelling?\"     - BALL OR LUMP: small ball or lump    - SKIN ONLY: localized; puffy or swollen area or patch of skin    - MILD JOINT SWELLING: joint feels or looks mildly swollen or puffy    - MODERATE JOINT SWELLING: moderate joint swelling; looks swollen    - SEVERE JOINT SWELLING:  severe joint swelling; can barely bend or move joint      Moderate to severe  4. RECURRENT SYMPTOM: \"Have you had wrist swelling before?\" If so, ask: \"When was the last time?\" \"What happened that time?\"      No since diagnosed 4 months ago, it is on and off   5. CAUSE: \"What do you think is causing the wrist swelling?\" (e.g., arthritis, ganglion cyst, insect bite, recent injury)      Arthritis and coming down off prednisone. When taking prednisone the swelling goes down.   6. OTHER SYMPTOMS: \"Do you have any other symptoms?\" (e.g., fever, hand pain)      Pain in the hand and a little bit of redness  7. PREGNANCY: \"Is there any chance you are pregnant?\" \"When was your last menstrual period?\"      No    Protocols used: WRIST SWELLING-A-AH      "

## 2021-07-06 NOTE — TELEPHONE ENCOUNTER
omeprazole (PRILOSEC) 20 MG DR capsule      Last Written Prescription Date:  Med is historical - filled by gastro in [place] who advised patient to call her PCP for refill due to not being seen in over a year.   Last Fill Quantity: NA,   # refills: NA  Last Office Visit: 06/21/21  Future Office visit:    Next 5 appointments (look out 90 days)    Jul 29, 2021  1:45 PM  (Arrive by 1:30 PM)  SHORT with Phoebe Gaitan NP  [facility] [place] ([facility] [place] ) [address]  [place] [address]  [phone]           Routing refill request to provider for review/approval because:  Medication is reported/historical

## 2021-07-07 ENCOUNTER — OFFICE VISIT (OUTPATIENT)
Dept: FAMILY MEDICINE | Facility: OTHER | Age: 61
End: 2021-07-07
Attending: PHYSICIAN ASSISTANT
Payer: COMMERCIAL

## 2021-07-07 ENCOUNTER — ANCILLARY PROCEDURE (OUTPATIENT)
Dept: GENERAL RADIOLOGY | Facility: OTHER | Age: 61
End: 2021-07-07
Attending: PHYSICIAN ASSISTANT
Payer: COMMERCIAL

## 2021-07-07 VITALS
HEART RATE: 51 BPM | SYSTOLIC BLOOD PRESSURE: 118 MMHG | BODY MASS INDEX: 30 KG/M2 | DIASTOLIC BLOOD PRESSURE: 80 MMHG | TEMPERATURE: 97.9 F | HEIGHT: 62 IN | WEIGHT: 163 LBS | OXYGEN SATURATION: 98 %

## 2021-07-07 DIAGNOSIS — M05.741 RHEUMATOID ARTHRITIS INVOLVING BOTH HANDS WITH POSITIVE RHEUMATOID FACTOR (H): ICD-10-CM

## 2021-07-07 DIAGNOSIS — M05.742 RHEUMATOID ARTHRITIS INVOLVING BOTH HANDS WITH POSITIVE RHEUMATOID FACTOR (H): ICD-10-CM

## 2021-07-07 DIAGNOSIS — M05.741 RHEUMATOID ARTHRITIS INVOLVING BOTH HANDS WITH POSITIVE RHEUMATOID FACTOR (H): Primary | ICD-10-CM

## 2021-07-07 DIAGNOSIS — M05.742 RHEUMATOID ARTHRITIS INVOLVING BOTH HANDS WITH POSITIVE RHEUMATOID FACTOR (H): Primary | ICD-10-CM

## 2021-07-07 LAB
CRP SERPL-MCNC: <2.9 MG/L (ref 0–8)
ERYTHROCYTE [SEDIMENTATION RATE] IN BLOOD BY WESTERGREN METHOD: 6 MM/H (ref 0–30)

## 2021-07-07 PROCEDURE — 73110 X-RAY EXAM OF WRIST: CPT | Mod: TC | Performed by: RADIOLOGY

## 2021-07-07 PROCEDURE — 99214 OFFICE O/P EST MOD 30 MIN: CPT | Performed by: PHYSICIAN ASSISTANT

## 2021-07-07 PROCEDURE — 36415 COLL VENOUS BLD VENIPUNCTURE: CPT | Performed by: PHYSICIAN ASSISTANT

## 2021-07-07 PROCEDURE — 72050 X-RAY EXAM NECK SPINE 4/5VWS: CPT | Mod: TC | Performed by: RADIOLOGY

## 2021-07-07 PROCEDURE — 85652 RBC SED RATE AUTOMATED: CPT | Performed by: PHYSICIAN ASSISTANT

## 2021-07-07 PROCEDURE — 86200 CCP ANTIBODY: CPT | Performed by: PHYSICIAN ASSISTANT

## 2021-07-07 PROCEDURE — 81374 HLA I TYPING 1 ANTIGEN LR: CPT | Performed by: PHYSICIAN ASSISTANT

## 2021-07-07 PROCEDURE — 86140 C-REACTIVE PROTEIN: CPT | Performed by: PHYSICIAN ASSISTANT

## 2021-07-07 RX ORDER — PREDNISONE 20 MG/1
20 TABLET ORAL 2 TIMES DAILY
Qty: 60 TABLET | Refills: 1 | Status: SHIPPED | OUTPATIENT
Start: 2021-07-07

## 2021-07-07 ASSESSMENT — MIFFLIN-ST. JEOR: SCORE: 1272.61

## 2021-07-07 NOTE — PROGRESS NOTES
Subjective     Annamaria Baer is a 60 year old female who presents to clinic today for the following health issues:    HPI         Concern - Joint issues  Onset: ongoing in joints  Description: takes 40mg of prednisone and does the taper as  Soon as he gets down to 5mg of tapering dose she experience horrible pain in her joints. Joints in wrist and both feet feels like marbles on the bottom of her foot and left wrist feels like a growth hard and painful.   Intensity: severe, 10/10  Progression of Symptoms:  worsening  Accompanying Signs & Symptoms: left wrist only with swelling and warmth   Previous history of similar problem: no but had covid int he spring.   Precipitating factors:        Worsened by: being off prednisone.   Alleviating factors:        Improved by: 40 mg or better of prednisone.  Pain medication did not help. Gabapentin is used for the burning feet pain on the bottoms of the feet.   Therapies tried and outcome: does take prednisone and gabapentin for the pain        Patient Active Problem List   Diagnosis     History of colectomy     Enthesopathy of hip region     Diverticulosis of large intestine     Diverticulitis of colon     Abdominal pain, chronic, generalized     Hx of benign carcinoid tumor     PUD (peptic ulcer disease)     Rheumatoid arthritis involving both hands with positive rheumatoid factor (H)     Past Surgical History:   Procedure Laterality Date     ANKLE SURGERY Left       SECTION      X2     COLONOSCOPY  2014    mild Diverticulosis - Dr. Chowdhury     COLONOSCOPY  2009    lucas yen  [facility]     COLONOSCOPY - HIM SCAN  2015    Hyperplastic Polyp - Dr. Chowdhury     GALLBLADDER SURGERY       RESECTION ABDOMINAL PERINEAL         Social History     Tobacco Use     Smoking status: Former Smoker     Packs/day: 1.50     Years: 24.00     Pack years: 36.00     Types: Cigarettes     Start date: 1976     Quit date: 1/3/2000     Years  since quittin.5     Smokeless tobacco: Never Used   Substance Use Topics     Alcohol use: Yes     Alcohol/week: 2.0 standard drinks     Types: 1 Standard drinks or equivalent, 1 Glasses of wine per week     Family History   Problem Relation Age of Onset     Cerebrovascular Disease Mother      Cirrhosis Father      Bone Cancer Maternal Grandmother      Diabetes Paternal Grandmother      Cancer Paternal Grandfather          Current Outpatient Medications   Medication Sig Dispense Refill     FIBER PO Take 6 tablets by mouth daily       gabapentin (NEURONTIN) 300 MG capsule Take 1 capsule (300 mg) by mouth At Bedtime 30 capsule 1     omeprazole (PRILOSEC) 20 MG DR capsule Take 1 capsule (20 mg) by mouth daily 90 capsule 1     predniSONE (DELTASONE) 20 MG tablet Take 1 tablet (20 mg) by mouth 2 times daily 60 tablet 1     predniSONE (DELTASONE) 5 MG tablet Take 7 tablets (35 mg) by mouth daily for 7 days, THEN 6 tablets (30 mg) daily for 7 days, THEN 5 tablets (25 mg) daily for 7 days, THEN 4 tablets (20 mg) daily for 7 days, THEN 3 tablets (15 mg) daily for 7 days, THEN 2 tablets (10 mg) daily for 7 days, THEN 1 tablet (5 mg) daily for 7 days. 196 tablet 0     sucralfate (CARAFATE) 1 GM tablet Take 1 g by mouth daily       Allergies   Allergen Reactions     Minocycline Other (See Comments) and Itching     Sulfa Drugs Hives     Recent Labs   Lab Test 21  1412 20  1620 18  0908 17  1002   A1C 5.7*  --   --   --    LDL  --   --  120*  --    HDL  --   --  48*  --    TRIG  --   --  140  --    ALT 34  --   --  44   CR 0.60  --  0.66 0.73   GFRESTIMATED >90  --  >90 83   GFRESTBLACK >90  --  >90 >90  African American GFR Calc     POTASSIUM 3.5  --  4.0 3.9   TSH 1.35 4.73*  --   --       BP Readings from Last 3 Encounters:   21 118/80   21 132/78   20 (!) 148/82    Wt Readings from Last 3 Encounters:   21 73.9 kg (163 lb)   21 77.4 kg (170 lb 9.6 oz)   20 72.6 kg  "(160 lb)                    Reviewed and updated as needed this visit by Provider     Problems             Review of Systems   Constitutional, HEENT, cardiovascular, pulmonary, gi and gu systems are negative, except as otherwise noted.        Objective    /80 (BP Location: Right arm, Patient Position: Sitting, Cuff Size: Adult Regular)   Pulse 51   Temp 97.9  F (36.6  C) (Tympanic)   Ht 1.575 m (5' 2\")   Wt 73.9 kg (163 lb)   SpO2 98%   BMI 29.81 kg/m    Body mass index is 29.81 kg/m .  Physical Exam   GENERAL: healthy, alert and no distress  EYES: Eyes grossly normal to inspection, PERRL and conjunctivae and sclerae normal  HENT: ear canals and TM's normal, nose and mouth without ulcers or lesions  NECK: no adenopathy, no asymmetry, masses, or scars and thyroid normal to palpation  RESP: lungs clear to auscultation - no rales, rhonchi or wheezes  CV: regular rate and rhythm, normal S1 S2, no S3 or S4, no murmur, click or rub, no peripheral edema and peripheral pulses strong  ABDOMEN: soft, nontender, no hepatosplenomegaly, no masses and bowel sounds normal  MS: left wrist is very sore and swollen and warm. .  Pain goes into the forearm.  is mildly weak. Too painful  .   SKIN: no suspicious lesions or rashes  NEURO: Normal strength and tone, weakness of left forearm and wrist. , mentation intact and sensation is ok. Very emotional and raw due to severe pain.   PSYCH: mentation appears normal, anxious. Hates prednisone makes her Crazy!     Diagnostic Test Results:  Labs reviewed in Epic  Results for orders placed or performed in visit on 07/07/21 (from the past 24 hour(s))   ESR: Erythrocyte sedimentation rate   Result Value Ref Range    Sed Rate 6 0 - 30 mm/h   CRP, inflammation   Result Value Ref Range    CRP Inflammation <2.9 0.0 - 8.0 mg/L           Assessment & Plan     Rheumatoid arthritis involving both hands with positive rheumatoid factor (H)  She needs to have an appointment soon. Can't " stay on the prednisone. Has been on and off for 4 weeks. She is crying will drive anywhere or do anything. Prednisone helps and that is it. No pain med given tried NSAID.     - Rheumatology Referral; Future  - predniSONE (DELTASONE) 20 MG tablet; Take 1 tablet (20 mg) by mouth 2 times daily  - HLA-B27 Typing  - Cyclic Citrullinated Peptide Antibody IgG  - ESR: Erythrocyte sedimentation rate  - CRP, inflammation  - XR Wrist Right G/E 3 Views (Clinic Performed); Future  - XR Wrist Left G/E 3 Views (Clinic Performed); Future  - XR CERVICAL SPINE G/E 4 VIEWS (Clinic Performed); Future        See Patient Instructions    No follow-ups on file.    JOLENE Turcios  [facility] [place]

## 2021-07-07 NOTE — NURSING NOTE
"Chief Complaint   Patient presents with     follow up joint issues       Initial There were no vitals taken for this visit. Estimated body mass index is 31.2 kg/m  as calculated from the following:    Height as of 9/3/20: 1.575 m (5' 2\").    Weight as of 6/8/21: 77.4 kg (170 lb 9.6 oz).  Medication Reconciliation: complete  Shania Ziegler LPN  "

## 2021-07-08 LAB — CCP AB SER IA-ACNC: 35 U/ML

## 2021-07-12 ENCOUNTER — NURSE TRIAGE (OUTPATIENT)
Dept: FAMILY MEDICINE | Facility: OTHER | Age: 61
End: 2021-07-12

## 2021-07-12 NOTE — TELEPHONE ENCOUNTER
"    Reason for Disposition    [1] MODERATE pain (e.g., interferes with normal activities) AND [2] pain comes and goes (cramps) AND [3] present > 24 hours  (Exception: pain with Vomiting or Diarrhea - see that Guideline)    Additional Information    Negative: Shock suspected (e.g., cold/pale/clammy skin, too weak to stand, low BP, rapid pulse)    Negative: Difficult to awaken or acting confused (e.g., disoriented, slurred speech)    Negative: Passed out (i.e., lost consciousness, collapsed and was not responding)    Negative: Sounds like a life-threatening emergency to the triager    Negative: Chest pain    Negative: Pain is mainly in upper abdomen  (if needed ask: \"is it mainly above the belly button?\")    Negative: Followed an abdomen (stomach) injury    Negative: [1] Abdominal pain AND [2] pregnant < 20 weeks    Negative: [1] Abdominal pain AND [2] pregnant > 20 weeks    Negative: [1] Abdominal pain AND [2] postpartum (from 0 to 6 weeks after delivery)    Negative: [1] SEVERE pain (e.g., excruciating) AND [2] present > 1 hour    Negative: [1] SEVERE pain AND [2] age > 60    Negative: [1] Vomiting AND [2] contains red blood or black (\"coffee ground\") material  (Exception: few red streaks in vomit that only happened once)    Negative: Blood in bowel movements   (Exception: blood on surface of BM with constipation)    Negative: Black or tarry bowel movements  (Exception: chronic-unchanged  black-grey bowel movements AND is taking iron pills or Pepto-bismol)    Negative: Patient sounds very sick or weak to the triager    Negative: [1] MILD-MODERATE pain AND [2] constant AND [3] present > 2 hours    Negative: [1] Vomiting AND [2] abdomen looks much more swollen than usual    Negative: [1] Vomiting AND [2] contains bile (green color)    Negative: White of the eyes have turned yellow (i.e., jaundice)    Negative: Fever > 103 F (39.4 C)    Negative: [1] Fever > 101 F (38.3 C) AND [2] age > 60    Negative: [1] Fever > " "100.0 F (37.8 C) AND [2] bedridden (e.g., nursing home patient, CVA, chronic illness, recovering from surgery)    Negative: [1] Fever > 100.0 F (37.8 C) AND [2] diabetes mellitus or weak immune system (e.g., HIV positive, cancer chemo, splenectomy, organ transplant, chronic steroids)    Negative: [1] SEVERE pain AND [2] present < 1 hour    Answer Assessment - Initial Assessment Questions  1. LOCATION: \"Where does it hurt?\"       Upper, mid to left  2. RADIATION: \"Does the pain shoot anywhere else?\" (e.g., chest, back)      no  3. ONSET: \"When did the pain begin?\" (e.g., minutes, hours or days ago)       Last week  4. SUDDEN: \"Gradual or sudden onset?\"      gradual  5. PATTERN \"Does the pain come and go, or is it constant?\"     - If constant: \"Is it getting better, staying the same, or worsening?\"       (Note: Constant means the pain never goes away completely; most serious pain is constant and it progresses)      - If intermittent: \"How long does it last?\" \"Do you have pain now?\"      (Note: Intermittent means the pain goes away completely between bouts)      intermittent  6. SEVERITY: \"How bad is the pain?\"  (e.g., Scale 1-10; mild, moderate, or severe)    - MILD (1-3): doesn't interfere with normal activities, abdomen soft and not tender to touch     - MODERATE (4-7): interferes with normal activities or awakens from sleep, tender to touch     - SEVERE (8-10): excruciating pain, doubled over, unable to do any normal activities       moderate  7. RECURRENT SYMPTOM: \"Have you ever had this type of abdominal pain before?\" If so, ask: \"When was the last time?\" and \"What happened that time?\"       Yes, had stomach ulcer last year  8. CAUSE: \"What do you think is causing the abdominal pain?\"      Stomach ulcer recurrence after use of prednisone  9. RELIEVING/AGGRAVATING FACTORS: \"What makes it better or worse?\" (e.g., movement, antacids, bowel movement)      Prednisone makes worse  10. OTHER SYMPTOMS: \"Has there been " "any vomiting, diarrhea, constipation, or urine problems?\"        Nausea, no vomiting or diarrhea, no blood in BMs, no black or tarry appearance to BMs  11. PREGNANCY: \"Is there any chance you are pregnant?\" \"When was your last menstrual period?\"        no    Protocols used: ABDOMINAL PAIN - FEMALE-A-AH      "

## 2021-08-02 LAB
B LOCUS: NORMAL
B27TEST METHOD: NORMAL

## 2021-08-24 ENCOUNTER — TRANSFERRED RECORDS (OUTPATIENT)
Dept: HEALTH INFORMATION MANAGEMENT | Facility: CLINIC | Age: 61
End: 2021-08-24

## 2021-09-07 ENCOUNTER — TRANSFERRED RECORDS (OUTPATIENT)
Dept: HEALTH INFORMATION MANAGEMENT | Facility: CLINIC | Age: 61
End: 2021-09-07

## 2021-10-09 ENCOUNTER — HEALTH MAINTENANCE LETTER (OUTPATIENT)
Age: 61
End: 2021-10-09

## 2021-10-11 ENCOUNTER — NURSE TRIAGE (OUTPATIENT)
Dept: FAMILY MEDICINE | Facility: OTHER | Age: 61
End: 2021-10-11

## 2021-10-11 NOTE — TELEPHONE ENCOUNTER
"Patient called with symptoms of urination frequency and urgency. Patient denies fever or back pain. Patient scheduled to see covering provider this afternoon. Patient advised to call back with worsening symptoms. Patient verbalized understanding.    Reason for Disposition    Urinating more frequently than usual (i.e., frequency)    Additional Information    Negative: Shock suspected (e.g., cold/pale/clammy skin, too weak to stand, low BP, rapid pulse)    Negative: Sounds like a life-threatening emergency to the triager    Negative: Followed a genital area injury    Negative: Followed a genital area injury (penis, scrotum)    Negative: Vaginal discharge    Negative: Pus (white, yellow) or bloody discharge from end of penis    Negative: Discomfort (pain, burning or stinging) when passing urine and pregnant    Negative: Discomfort (pain, burning or stinging) when passing urine and female    Negative: Discomfort (pain, burning or stinging) when passing urine and male    Negative: Pain or itching in the vulvar area    Negative: Blood in the urine is main symptom    Negative: Pain in scrotum is main symptom    Negative: Symptoms arising from use of a urinary catheter (Tan or Coude)    Negative: Unable to urinate (or only a few drops) > 4 hours and bladder feels very full (e.g., palpable bladder or strong urge to urinate)    Negative: Decreased urination and drinking very little and dehydration suspected (e.g., dark urine, no urine > 12 hours, very dry mouth, very lightheaded)    Negative: Patient sounds very sick or weak to the triager    Negative: Fever > 100.4 F (38.0 C)    Negative: Side (flank) or lower back pain present    Negative: Can't control passage of urine (i.e., urinary incontinence) and new onset (< 2 weeks) or worsening    Answer Assessment - Initial Assessment Questions  1. SYMPTOM: \"What's the main symptom you're concerned about?\" (e.g., frequency, incontinence)      Urination urgency/ frequency  2. " "ONSET: \"When did the  urgency  start?\"      yesterday  3. PAIN: \"Is there any pain?\" If so, ask: \"How bad is it?\" (Scale: 1-10; mild, moderate, severe)      no  4. CAUSE: \"What do you think is causing the symptoms?\"      Possible UTI  5. OTHER SYMPTOMS: \"Do you have any other symptoms?\" (e.g., fever, flank pain, blood in urine, pain with urination)      no  6. PREGNANCY: \"Is there any chance you are pregnant?\" \"When was your last menstrual period?\"      no    Protocols used: URINARY SYMPTOMS-A-[place]      "

## 2021-10-15 ENCOUNTER — LAB (OUTPATIENT)
Dept: LAB | Facility: OTHER | Age: 61
End: 2021-10-15
Payer: COMMERCIAL

## 2021-10-15 ENCOUNTER — NURSE TRIAGE (OUTPATIENT)
Dept: FAMILY MEDICINE | Facility: OTHER | Age: 61
End: 2021-10-15

## 2021-10-15 DIAGNOSIS — R35.0 URINARY FREQUENCY: Primary | ICD-10-CM

## 2021-10-15 DIAGNOSIS — N39.0 URINARY TRACT INFECTION: Primary | ICD-10-CM

## 2021-10-15 DIAGNOSIS — N30.00 ACUTE CYSTITIS WITHOUT HEMATURIA: ICD-10-CM

## 2021-10-15 DIAGNOSIS — R35.0 URINARY FREQUENCY: ICD-10-CM

## 2021-10-15 LAB
ALBUMIN UR-MCNC: 10 MG/DL
APPEARANCE UR: ABNORMAL
BACTERIA #/AREA URNS HPF: ABNORMAL /HPF
BILIRUB UR QL STRIP: NEGATIVE
CAOX CRY #/AREA URNS HPF: ABNORMAL /HPF
COLOR UR AUTO: YELLOW
GLUCOSE UR STRIP-MCNC: NEGATIVE MG/DL
HGB UR QL STRIP: ABNORMAL
KETONES UR STRIP-MCNC: 10 MG/DL
LEUKOCYTE ESTERASE UR QL STRIP: ABNORMAL
MUCOUS THREADS #/AREA URNS LPF: PRESENT /LPF
NITRATE UR QL: NEGATIVE
PH UR STRIP: 7 [PH] (ref 4.7–8)
RBC URINE: 17 /HPF
SP GR UR STRIP: 1.02 (ref 1–1.03)
SQUAMOUS EPITHELIAL: 10 /HPF
UROBILINOGEN UR STRIP-MCNC: NORMAL MG/DL
WBC URINE: 136 /HPF

## 2021-10-15 PROCEDURE — 87186 SC STD MICRODIL/AGAR DIL: CPT

## 2021-10-15 PROCEDURE — 87088 URINE BACTERIA CULTURE: CPT

## 2021-10-15 PROCEDURE — 81001 URINALYSIS AUTO W/SCOPE: CPT

## 2021-10-15 PROCEDURE — 87086 URINE CULTURE/COLONY COUNT: CPT

## 2021-10-15 RX ORDER — CIPROFLOXACIN 250 MG/1
250 TABLET, FILM COATED ORAL 2 TIMES DAILY
Qty: 10 TABLET | Refills: 0 | Status: SHIPPED | OUTPATIENT
Start: 2021-10-15 | End: 2021-10-20

## 2021-10-15 NOTE — TELEPHONE ENCOUNTER
Pt called triage UTI sx requesting to be seen and/or UA lab only order.     Dr. MERRICK Garsia, covering provider notified on sx ok to order UA. Pt denies needing to be seen again agrees to UA. Writer signed orders. Further assessment in triage below.     Advised to call back directly if there are further questions, or if these symptoms fail to improve as anticipated or worsen. Go to ED if sx worsen or with any concerns.     Reason for Disposition    Urinating more frequently than usual (i.e., frequency)    Additional Information    Negative: Shock suspected (e.g., cold/pale/clammy skin, too weak to stand, low BP, rapid pulse)    Negative: Sounds like a life-threatening emergency to the triager    Negative: Followed a genital area injury    Negative: Followed a genital area injury (penis, scrotum)    Negative: Vaginal discharge    Negative: Pus (white, yellow) or bloody discharge from end of penis    Negative: [1] Taking antibiotic for urinary tract infection (UTI) AND [2] female    Negative: [1] Taking antibiotic for urinary tract infection (UTI) AND [2] male    Negative: [1] Discomfort (pain, burning or stinging) when passing urine AND [2] pregnant    Negative: [1] Discomfort (pain, burning or stinging) when passing urine AND [2] postpartum (from 0 to 6 weeks after delivery)    Negative: [1] Discomfort (pain, burning or stinging) when passing urine AND [2] female    Negative: [1] Discomfort (pain, burning or stinging) when passing urine AND [2] male    Negative: Pain or itching in the vulvar area    Negative: Pain in scrotum is main symptom    Negative: Blood in the urine is main symptom    Negative: Symptoms arising from use of a urinary catheter (Tan or Coude)    Negative: [1] Unable to urinate (or only a few drops) > 4 hours AND     [2] bladder feels very full (e.g., palpable bladder or strong urge to urinate)    Negative: [1] Decreased urination and [2] drinking very little AND [2] dehydration suspected (e.g.,  "dark urine, no urine > 12 hours, very dry mouth, very lightheaded)    Negative: Patient sounds very sick or weak to the triager    Negative: Fever > 100.4 F (38.0 C)    Answer Assessment - Initial Assessment Questions  1. SYMPTOM: \"What's the main symptom you're concerned about?\" (e.g., frequency, incontinence)      Urgency, cloud urine, frequency  2. ONSET: \"When did the  sx  start?\"      10/11/21  3. PAIN: \"Is there any pain?\" If so, ask: \"How bad is it?\" (Scale: 1-10; mild, moderate, severe)      denies  4. CAUSE: \"What do you think is causing the symptoms?\"      uti  5. OTHER SYMPTOMS: \"Do you have any other symptoms?\" (e.g., fever, flank pain, blood in urine, pain with urination)      Dysuria mild  6. PREGNANCY: \"Is there any chance you are pregnant?\" \"When was your last menstrual period?\"      no    Protocols used: URINARY SYMPTOMS-A-AH      "

## 2021-10-17 LAB — BACTERIA UR CULT: ABNORMAL

## 2021-12-17 ENCOUNTER — TELEPHONE (OUTPATIENT)
Dept: MAMMOGRAPHY | Facility: OTHER | Age: 61
End: 2021-12-17

## 2021-12-17 ENCOUNTER — ANCILLARY PROCEDURE (OUTPATIENT)
Dept: MAMMOGRAPHY | Facility: OTHER | Age: 61
End: 2021-12-17
Attending: PHYSICIAN ASSISTANT
Payer: COMMERCIAL

## 2021-12-17 DIAGNOSIS — Z12.31 VISIT FOR SCREENING MAMMOGRAM: ICD-10-CM

## 2021-12-17 PROCEDURE — 77067 SCR MAMMO BI INCL CAD: CPT | Mod: TC | Performed by: RADIOLOGY

## 2021-12-17 PROCEDURE — 77063 BREAST TOMOSYNTHESIS BI: CPT | Mod: TC | Performed by: RADIOLOGY

## 2022-01-12 ENCOUNTER — TRANSFERRED RECORDS (OUTPATIENT)
Dept: HEALTH INFORMATION MANAGEMENT | Facility: CLINIC | Age: 62
End: 2022-01-12

## 2022-01-12 LAB
AST SERPL-CCNC: 18 IU/L (ref 10–40)
CREATININE (EXTERNAL): 0.71 MG/DL (ref 0.4–1)
GFR ESTIMATED (EXTERNAL): >60 ML/MIN/1.73M2

## 2022-01-23 DIAGNOSIS — K27.9 PUD (PEPTIC ULCER DISEASE): ICD-10-CM

## 2022-01-24 NOTE — TELEPHONE ENCOUNTER
Prilosec      Last Written Prescription Date:  07/06/21  Last Fill Quantity: 90,   # refills: 1  Last Office Visit: 07/07/21  Future Office visit:

## 2022-03-26 ENCOUNTER — HEALTH MAINTENANCE LETTER (OUTPATIENT)
Age: 62
End: 2022-03-26

## 2022-04-27 ENCOUNTER — LAB (OUTPATIENT)
Dept: LAB | Facility: OTHER | Age: 62
End: 2022-04-27
Payer: COMMERCIAL

## 2022-04-27 ENCOUNTER — NURSE TRIAGE (OUTPATIENT)
Dept: FAMILY MEDICINE | Facility: OTHER | Age: 62
End: 2022-04-27

## 2022-04-27 DIAGNOSIS — R35.0 URINARY FREQUENCY: ICD-10-CM

## 2022-04-27 DIAGNOSIS — R10.9 FLANK PAIN: ICD-10-CM

## 2022-04-27 DIAGNOSIS — R68.83 CHILLS: ICD-10-CM

## 2022-04-27 DIAGNOSIS — R35.0 URINARY FREQUENCY: Primary | ICD-10-CM

## 2022-04-27 DIAGNOSIS — N30.01 ACUTE CYSTITIS WITH HEMATURIA: Primary | ICD-10-CM

## 2022-04-27 LAB
ALBUMIN UR-MCNC: 30 MG/DL
APPEARANCE UR: ABNORMAL
BACTERIA #/AREA URNS HPF: ABNORMAL /HPF
BASOPHILS # BLD AUTO: 0 10E3/UL (ref 0–0.2)
BASOPHILS NFR BLD AUTO: 0 %
BILIRUB UR QL STRIP: NEGATIVE
COLOR UR AUTO: YELLOW
EOSINOPHIL # BLD AUTO: 0 10E3/UL (ref 0–0.7)
EOSINOPHIL NFR BLD AUTO: 0 %
ERYTHROCYTE [DISTWIDTH] IN BLOOD BY AUTOMATED COUNT: 14.5 % (ref 10–15)
GLUCOSE UR STRIP-MCNC: NEGATIVE MG/DL
HCT VFR BLD AUTO: 44.5 % (ref 35–47)
HGB BLD-MCNC: 14.8 G/DL (ref 11.7–15.7)
HGB UR QL STRIP: NEGATIVE
HYALINE CASTS: 11 /LPF
IMM GRANULOCYTES # BLD: 0.1 10E3/UL
IMM GRANULOCYTES NFR BLD: 0 %
KETONES UR STRIP-MCNC: NEGATIVE MG/DL
LEUKOCYTE ESTERASE UR QL STRIP: ABNORMAL
LYMPHOCYTES # BLD AUTO: 2.8 10E3/UL (ref 0.8–5.3)
LYMPHOCYTES NFR BLD AUTO: 22 %
MCH RBC QN AUTO: 30.6 PG (ref 26.5–33)
MCHC RBC AUTO-ENTMCNC: 33.3 G/DL (ref 31.5–36.5)
MCV RBC AUTO: 92 FL (ref 78–100)
MONOCYTES # BLD AUTO: 0.8 10E3/UL (ref 0–1.3)
MONOCYTES NFR BLD AUTO: 6 %
MUCOUS THREADS #/AREA URNS LPF: PRESENT /LPF
NEUTROPHILS # BLD AUTO: 9 10E3/UL (ref 1.6–8.3)
NEUTROPHILS NFR BLD AUTO: 72 %
NITRATE UR QL: POSITIVE
NRBC # BLD AUTO: 0 10E3/UL
NRBC BLD AUTO-RTO: 0 /100
PH UR STRIP: 5.5 [PH] (ref 4.7–8)
PLATELET # BLD AUTO: 239 10E3/UL (ref 150–450)
RBC # BLD AUTO: 4.83 10E6/UL (ref 3.8–5.2)
RBC URINE: 5 /HPF
SP GR UR STRIP: 1.02 (ref 1–1.03)
SQUAMOUS EPITHELIAL: 10 /HPF
UROBILINOGEN UR STRIP-MCNC: NORMAL MG/DL
WBC # BLD AUTO: 12.7 10E3/UL (ref 4–11)
WBC URINE: 25 /HPF

## 2022-04-27 PROCEDURE — 36415 COLL VENOUS BLD VENIPUNCTURE: CPT

## 2022-04-27 PROCEDURE — 85025 COMPLETE CBC W/AUTO DIFF WBC: CPT

## 2022-04-27 PROCEDURE — 87186 SC STD MICRODIL/AGAR DIL: CPT

## 2022-04-27 PROCEDURE — 81001 URINALYSIS AUTO W/SCOPE: CPT

## 2022-04-27 PROCEDURE — 87086 URINE CULTURE/COLONY COUNT: CPT

## 2022-04-27 PROCEDURE — 87088 URINE BACTERIA CULTURE: CPT

## 2022-04-27 RX ORDER — NITROFURANTOIN 25; 75 MG/1; MG/1
100 CAPSULE ORAL 2 TIMES DAILY
Qty: 14 CAPSULE | Refills: 0 | Status: SHIPPED | OUTPATIENT
Start: 2022-04-27 | End: 2022-04-28

## 2022-04-27 NOTE — TELEPHONE ENCOUNTER
Pt calling and thinks she has a UTI.About a week ago she started to have flank pain,urinary frequency,monik urine, chills earlier today and yesterday.She has not checked for a temp but think she had a fever.    Spoke with PCP will check CBC, UA and if blood in urine she will need to go to UC for eval.Advised if s/s worsen she needs to go to UC/ED. She agrees with POC.    Pended orders.    Graciela Jamil RN      Reason for Disposition    Side (flank) or lower back pain present    Urinating more frequently than usual (i.e., frequency)    Fever > 100.4 F (38.0 C)    Additional Information    Negative: Shock suspected (e.g., cold/pale/clammy skin, too weak to stand, low BP, rapid pulse)    Negative: Sounds like a life-threatening emergency to the triager    Negative: Followed a genital area injury    Negative: Followed a genital area injury (penis, scrotum)    Negative: Vaginal discharge    Negative: Pus (white, yellow) or bloody discharge from end of penis    Negative: [1] Taking antibiotic for urinary tract infection (UTI) AND [2] female    Negative: [1] Taking antibiotic for urinary tract infection (UTI) AND [2] male    Negative: [1] Discomfort (pain, burning or stinging) when passing urine AND [2] pregnant    Negative: [1] Discomfort (pain, burning or stinging) when passing urine AND [2] postpartum (from 0 to 6 weeks after delivery)    Negative: [1] Discomfort (pain, burning or stinging) when passing urine AND [2] female    Negative: [1] Discomfort (pain, burning or stinging) when passing urine AND [2] male    Negative: Pain or itching in the vulvar area    Negative: Pain in scrotum is main symptom    Negative: Blood in the urine is main symptom    Negative: Symptoms arising from use of a urinary catheter (Tan or Coude)    Negative: [1] Unable to urinate (or only a few drops) > 4 hours AND     [2] bladder feels very full (e.g., palpable bladder or strong urge to urinate)    Negative: [1] Decreased urination and [2]  "drinking very little AND [2] dehydration suspected (e.g., dark urine, no urine > 12 hours, very dry mouth, very lightheaded)    Negative: Patient sounds very sick or weak to the triager    Answer Assessment - Initial Assessment Questions  1. SYMPTOM: \"What's the main symptom you're concerned about?\" (e.g., frequency, incontinence)      Fever, urgency and also on infusions of for arthritis, urine is colored monik  2. ONSET: \"When did the   start?\"     Week ago  3. PAIN: \"Is there any pain?\" If so, ask: \"How bad is it?\" (Scale: 1-10; mild, moderate, severe)      no  4. CAUSE: \"What do you think is causing the symptoms?\"      UTI  5. OTHER SYMPTOMS: \"Do you have any other symptoms?\" (e.g., fever, flank pain, blood in urine, pain with urination)      Fever-have not taken but she has chills,   6. PREGNANCY: \"Is there any chance you are pregnant?\" \"When was your last menstrual period?\"      na    Protocols used: URINARY SYMPTOMS-A-AH      "

## 2022-04-28 DIAGNOSIS — N30.01 ACUTE CYSTITIS WITH HEMATURIA: ICD-10-CM

## 2022-04-28 RX ORDER — NITROFURANTOIN 25; 75 MG/1; MG/1
100 CAPSULE ORAL 2 TIMES DAILY
Qty: 14 CAPSULE | Refills: 0 | Status: SHIPPED | OUTPATIENT
Start: 2022-04-28

## 2022-04-28 NOTE — TELEPHONE ENCOUNTER
Patient calling and states Macrobid is not available until 05/04/22 at [facility]. This writer called [facility] pharmacy in [place] and staff states they are able to fill it there. repended Rx to new pharmacy. Please advise thank you.

## 2022-04-29 LAB — BACTERIA UR CULT: ABNORMAL

## 2022-05-05 ENCOUNTER — TELEPHONE (OUTPATIENT)
Dept: FAMILY MEDICINE | Facility: OTHER | Age: 62
End: 2022-05-05
Payer: COMMERCIAL

## 2022-05-05 DIAGNOSIS — R35.0 URINARY FREQUENCY: Primary | ICD-10-CM

## 2022-05-05 NOTE — TELEPHONE ENCOUNTER
11:30 AM    Reason for Call: Phone Call    Description: Patient called and states that she was supposed to have a follow up lab done after she finished her antibiotic. Patient states that she finished her antibiotic on 05/04/22. There are no orders to schedule from in [facility]. Please call patient for further discussion.     Was an appointment offered for this call? No  If yes : Appointment type              Date    Preferred method for responding to this message: Telephone Call  What is your phone number ? [phone]    If we cannot reach you directly, may we leave a detailed response at the number you provided? Yes    Can this message wait until your PCP/provider returns, if available today? Not applicable, provider is in today     Vanda Madrid

## 2022-05-06 ENCOUNTER — LAB (OUTPATIENT)
Dept: LAB | Facility: OTHER | Age: 62
End: 2022-05-06
Payer: COMMERCIAL

## 2022-05-06 DIAGNOSIS — R35.0 URINARY FREQUENCY: ICD-10-CM

## 2022-05-06 LAB
ALBUMIN UR-MCNC: 10 MG/DL
APPEARANCE UR: CLEAR
BACTERIA #/AREA URNS HPF: ABNORMAL /HPF
BILIRUB UR QL STRIP: NEGATIVE
COLOR UR AUTO: ABNORMAL
GLUCOSE UR STRIP-MCNC: NEGATIVE MG/DL
HGB UR QL STRIP: ABNORMAL
HYALINE CASTS: 1 /LPF
KETONES UR STRIP-MCNC: NEGATIVE MG/DL
LEUKOCYTE ESTERASE UR QL STRIP: NEGATIVE
MUCOUS THREADS #/AREA URNS LPF: PRESENT /LPF
NITRATE UR QL: NEGATIVE
PH UR STRIP: 6 [PH] (ref 4.7–8)
RBC URINE: 3 /HPF
SP GR UR STRIP: 1.01 (ref 1–1.03)
SQUAMOUS EPITHELIAL: 11 /HPF
UROBILINOGEN UR STRIP-MCNC: NORMAL MG/DL
WBC URINE: 1 /HPF

## 2022-05-06 PROCEDURE — 81001 URINALYSIS AUTO W/SCOPE: CPT

## 2022-05-21 ENCOUNTER — HEALTH MAINTENANCE LETTER (OUTPATIENT)
Age: 62
End: 2022-05-21

## 2022-05-23 ENCOUNTER — TELEPHONE (OUTPATIENT)
Dept: FAMILY MEDICINE | Facility: OTHER | Age: 62
End: 2022-05-23
Payer: COMMERCIAL

## 2022-05-23 NOTE — TELEPHONE ENCOUNTER
Received a PA from [facility] for Omeprazole 20MG dr capsules. Submitted on CMM. Received an instant APPROVAL. Effective 05/23/2022 to 05/22/2025. Forms scanned to Epic.

## 2022-05-31 ENCOUNTER — NURSE TRIAGE (OUTPATIENT)
Dept: FAMILY MEDICINE | Facility: OTHER | Age: 62
End: 2022-05-31
Payer: COMMERCIAL

## 2022-05-31 NOTE — TELEPHONE ENCOUNTER
"Pt calling and thinks she has covid.S/S are lost of taste,smell and fever. Vomiting and diarrhea. She is wondering what to do because she has RA and is immunocompromised. She has not taken a test to confirm positive covid    Updated can do anti-viral tx. But should be started within 5 days.     Offered to PCR or for her to do a home covid test and then call for antiviral [place]. She declined and states he will just wait it out.    Updated to call her specialty MD in 2 days if not better.Advised ED if s/s worsens.  She would like PCP to note.      Graciela Jamil RN    Discharge Instructions for COVID-19 Patients  You have--or may have--COVID-19. Please follow the instructions listed below.   If you have a weakened immune system, discuss with your doctor any other actions you need to take.  How can I protect others?  If you have symptoms (fever, cough, body aches or trouble breathing):    Stay home and away from others (self-isolate) until:  ? Your other symptoms have resolved (gotten better). And   ? You've had no fever--and no medicine that reduces fever--for 1 full day (24 hours). And   ? At least 10 days have passed since your symptoms started. (You may need to wait 20 days. Follow the advice of your care team.)  If you don't show symptoms, but testing showed that you have COVID-19:    Stay home and away from others (self-isolate) until at least 10 days have passed since the date of your first positive COVID-19 test.  During this time    Stay in your own room, even for meals. Use your own bathroom if you can.    Stay away from others in your home. No hugging, kissing or shaking hands. No visitors.    Don't go to work, school or anywhere else.    Clean \"high touch\" surfaces often (doorknobs, counters, handles). Use household cleaning spray or wipes.    You'll find a full list of  on the EPA website: www.epa.gov/pesticide-registration/list-n-disinfectants-use-against-sars-cov-2.    Cover your mouth and nose with a " mask or other face covering to avoid spreading germs.    Wash your hands and face often. Use soap and water.    Caregivers in these groups are at risk for severe illness due to COVID-19:  ? People 65 years and older  ? People who live in a nursing home or long-term care facility  ? People with chronic disease (lung, heart, cancer, diabetes, kidney, liver, immunologic)  ? People who have a weakened immune system, including those who:    Are in cancer treatment    Take medicine that weakens the immune system, such as corticosteroids    Had a bone marrow or organ transplant    Have an immune deficiency    Have poorly controlled HIV or AIDS    Are obese (body mass index of 40 or higher)    Smoke regularly    Caregivers should wear gloves while washing dishes, handling laundry and cleaning bedrooms and bathrooms.    Use caution when washing and drying laundry: Don't shake dirty laundry and use the warmest water setting that you can.    For more tips on managing your health at home, go to www.cdc.gov/coronavirus/2019-ncov/downloads/10Things.pdf.  How can I take care of myself at home?  1. Get lots of rest. Drink extra fluids (unless a doctor has told you not to).  2. Take Tylenol (acetaminophen) for fever or pain. If you have liver or kidney problems, ask your family doctor if it's okay to take Tylenol.   Adults can take either:   ? 650 mg (two 325 mg pills) every 4 to 6 hours, or   ? 1,000 mg (two 500 mg pills) every 8 hours as needed.  ? Note: Don't take more than 3,000 mg in one day. Acetaminophen is found in many medicines (both prescribed and over-the-counter medicines). Read all labels to be sure you don't take too much.   For children, check the Tylenol bottle for the right dose. The dose is based on the child's age or weight.  3. If you have other health problems (like cancer, heart failure, an organ transplant or severe kidney disease): Call your specialty clinic if you don't feel better in the next 2  days.  4. Know when to call 911. Emergency warning signs include:  ? Trouble breathing or shortness of breath  ? Pain or pressure in the chest that doesn't go away  ? Feeling confused like you haven't felt before, or not being able to wake up  ? Bluish-colored lips or face  5. Your doctor may have prescribed a blood thinner medicine. Follow their instructions.  Where can I get more information?    [facility] - About COVID-19:   [email or web address]    CDC - What to Do If You're Sick: www.cdc.gov/coronavirus/2019-ncov/about/steps-when-sick.html    CDC - Ending Home Isolation: www.cdc.gov/coronavirus/2019-ncov/hcp/disposition-in-home-patients.html    CDC - Caring for Someone: www.cdc.gov/coronavirus/2019-ncov/if-you-are-sick/care-for-someone.html    [facility] - Interim Guidance for Hospital Discharge to Home: www.[facility].mn.us/diseases/coronavirus/hcp/hospdischarge.pdf    Below are the COVID-19 hotlines at the [facility] ([facility]). Interpreters are available.  ? For health questions: Call 163-644-6148 or 1-283.386.9833 (7 a.m. to 7 p.m.)  ? For questions about schools and childcare: Call 376-610-2769 or 1-601.775.9726 (7 a.m. to 7 p.m.)    For informational purposes only. Not to replace the advice of your health care provider. Clinically reviewed by Dr. Enrrique Bell.   Copyright   2020 [employer]. All rights reserved. [employer] 527149 - REV 01/05/21.

## 2022-06-06 ENCOUNTER — NURSE TRIAGE (OUTPATIENT)
Dept: FAMILY MEDICINE | Facility: OTHER | Age: 62
End: 2022-06-06
Payer: COMMERCIAL

## 2022-06-06 NOTE — TELEPHONE ENCOUNTER
"Had 2 UTI's in the past 2 months that she was treated for and now it is back.  She has had 2 weeks of no symptoms since the last UTI but she also had Covid during that time.    Reason for Disposition    Patient wants to be seen    Answer Assessment - Initial Assessment Questions  1. SYMPTOM: \"What's the main symptom you're concerned about?\" (e.g., frequency, incontinence)      Pressure, urgency, burning, frequency  2. ONSET: \"When did the      start?\"      yesterday  3. PAIN: \"Is there any pain?\" If so, ask: \"How bad is it?\" (Scale: 1-10; mild, moderate, severe)      Yes buring  4. CAUSE: \"What do you think is causing the symptoms?\"      uti  5. OTHER SYMPTOMS: \"Do you have any other symptoms?\" (e.g., fever, flank pain, blood in urine, pain with urination)      Pain with urination  6. PREGNANCY: \"Is there any chance you are pregnant?\" \"When was your last menstrual period?\"      no    Protocols used: URINARY SYMPTOMS-A-[place]      "

## 2022-07-23 DIAGNOSIS — K27.9 PUD (PEPTIC ULCER DISEASE): ICD-10-CM

## 2022-07-25 NOTE — TELEPHONE ENCOUNTER
Prilosec       Last Written Prescription Date:  1/24/22  Last Fill Quantity: 30,   # refills: 5  Last Office Visit: 7/7/21  Future Office visit:

## 2022-09-11 ENCOUNTER — HEALTH MAINTENANCE LETTER (OUTPATIENT)
Age: 62
End: 2022-09-11

## 2022-10-11 ENCOUNTER — TRANSFERRED RECORDS (OUTPATIENT)
Dept: HEALTH INFORMATION MANAGEMENT | Facility: CLINIC | Age: 62
End: 2022-10-11

## 2022-10-12 ENCOUNTER — MEDICAL CORRESPONDENCE (OUTPATIENT)
Dept: HEALTH INFORMATION MANAGEMENT | Facility: HOSPITAL | Age: 62
End: 2022-10-12

## 2022-11-01 ENCOUNTER — TELEPHONE (OUTPATIENT)
Dept: INTERVENTIONAL RADIOLOGY/VASCULAR | Facility: HOSPITAL | Age: 62
End: 2022-11-01

## 2022-11-01 NOTE — TELEPHONE ENCOUNTER
Reminded patient of her appt on 11/3. Also reminded patient she needs a , and to not take any steroids, antibiotics or immunizations two weeks before and after this appt.    Angelia Ya

## 2022-11-03 ENCOUNTER — HOSPITAL ENCOUNTER (OUTPATIENT)
Facility: HOSPITAL | Age: 62
Discharge: HOME | End: 2022-11-03
Attending: RADIOLOGY | Admitting: RADIOLOGY
Payer: COMMERCIAL

## 2022-11-03 ENCOUNTER — HOSPITAL ENCOUNTER (OUTPATIENT)
Dept: INTERVENTIONAL RADIOLOGY/VASCULAR | Facility: HOSPITAL | Age: 62
Discharge: HOME | End: 2022-11-03
Attending: ORTHOPAEDIC SURGERY | Admitting: RADIOLOGY
Payer: COMMERCIAL

## 2022-11-03 DIAGNOSIS — M25.551 RIGHT HIP PAIN: ICD-10-CM

## 2022-11-03 DIAGNOSIS — R26.2 DIFFICULTY WALKING: ICD-10-CM

## 2022-11-03 PROCEDURE — 255N000002 HC RX 255 OP 636: Performed by: RADIOLOGY

## 2022-11-03 PROCEDURE — 250N000009 HC RX 250: Performed by: RADIOLOGY

## 2022-11-03 PROCEDURE — 250N000011 HC RX IP 250 OP 636: Performed by: RADIOLOGY

## 2022-11-03 PROCEDURE — 77002 NEEDLE LOCALIZATION BY XRAY: CPT

## 2022-11-03 RX ORDER — LIDOCAINE HYDROCHLORIDE 10 MG/ML
10 INJECTION, SOLUTION EPIDURAL; INFILTRATION; INTRACAUDAL; PERINEURAL ONCE
Status: COMPLETED | OUTPATIENT
Start: 2022-11-03 | End: 2022-11-03

## 2022-11-03 RX ORDER — TRIAMCINOLONE ACETONIDE 40 MG/ML
40 INJECTION, SUSPENSION INTRA-ARTICULAR; INTRAMUSCULAR ONCE
Status: COMPLETED | OUTPATIENT
Start: 2022-11-03 | End: 2022-11-03

## 2022-11-03 RX ORDER — IOPAMIDOL 612 MG/ML
50 INJECTION, SOLUTION INTRAVASCULAR ONCE
Status: COMPLETED | OUTPATIENT
Start: 2022-11-03 | End: 2022-11-03

## 2022-11-03 RX ADMIN — LIDOCAINE HYDROCHLORIDE 6 ML: 10 INJECTION, SOLUTION EPIDURAL; INFILTRATION; INTRACAUDAL; PERINEURAL at 13:05

## 2022-11-03 RX ADMIN — IOPAMIDOL 5 ML: 612 INJECTION, SOLUTION INTRAVENOUS at 13:05

## 2022-11-03 RX ADMIN — TRIAMCINOLONE ACETONIDE 40 MG: 40 INJECTION, SUSPENSION INTRA-ARTICULAR; INTRAMUSCULAR at 13:05

## 2022-11-03 ASSESSMENT — ACTIVITIES OF DAILY LIVING (ADL): ADLS_ACUITY_SCORE: 35

## 2022-11-03 NOTE — DISCHARGE INSTRUCTIONS
Cell number on file:    Telephone Information:   Mobile [phone]     Is it ok to leave a message at this number(s)? Yes    Dr Hyde completed your procedure on 11/3/2022.    Current Pain Level (0-10 Scale): 8/10  Post Pain Level (0-10):  10/10    Radiology Discharge instructions for Steroid Injection    Activity Level:     Do not do any heavy activity or exercise for 24 hours.   Do not drive for 4 hours after your injection.  Diet:   Return to your normal diet.  Medications:   If you have stopped taking your Aspirin, Coumadin/Warfarin, Ibuprofen, or any   other blood thinner for this procedure you may resume in the morning unless   your primary care provider has given you other instructions.    Diabetics may see an increase in blood sugar after steroid injections. If you are concerned about your blood sugar, please contact your family doctor.    Site Care:  Remove the bandage and bathe or shower the morning after the procedure.      Please allow two weeks to experience improvement in your pain.  If you have any further issues, please contact your provider.    Call your Primary Care Provider if you have the following (if your primary care provider is not available please seek emergency care):   Nausea with vomiting   Severe headache   Drowsiness or confusion   Redness or drainage at the injection or puncture site   Temperature over 101 degrees F   Other concerns   Worsening back pain   Stiff neck

## 2022-12-20 DIAGNOSIS — K27.9 PUD (PEPTIC ULCER DISEASE): ICD-10-CM

## 2022-12-22 NOTE — TELEPHONE ENCOUNTER
omeprazole (PRILOSEC) 20 MG DR capsule     -Last Written Prescription Date:  7-  L-ast Fill Quantity: 30,   # refills: 5  Last Office Visit: 7-7-2021 CARLO  Future Office visit:       Routing refill request to provider for review/approval because:  Drug not on the [facility], P or [facility] refill protocol or controlled substance

## 2023-06-03 ENCOUNTER — HEALTH MAINTENANCE LETTER (OUTPATIENT)
Age: 63
End: 2023-06-03

## 2023-06-09 DIAGNOSIS — K27.9 PUD (PEPTIC ULCER DISEASE): ICD-10-CM

## 2023-06-12 NOTE — TELEPHONE ENCOUNTER
omeprazole (PRILOSEC) 20 MG DR capsule 90 capsule 1 12/22/2022     Last Office Visit: 7-7-2021 CARLO  Future Office visit:       Routing refill request to provider for review/approval because:

## 2023-08-22 ENCOUNTER — ANCILLARY PROCEDURE (OUTPATIENT)
Dept: MAMMOGRAPHY | Facility: OTHER | Age: 63
End: 2023-08-22
Attending: PHYSICIAN ASSISTANT
Payer: COMMERCIAL

## 2023-08-22 DIAGNOSIS — Z12.31 VISIT FOR SCREENING MAMMOGRAM: ICD-10-CM

## 2023-08-22 PROCEDURE — 77067 SCR MAMMO BI INCL CAD: CPT | Mod: TC | Performed by: RADIOLOGY

## 2023-08-22 PROCEDURE — 77063 BREAST TOMOSYNTHESIS BI: CPT | Mod: TC | Performed by: RADIOLOGY

## 2023-09-01 ENCOUNTER — HOSPITAL ENCOUNTER (OUTPATIENT)
Dept: ULTRASOUND IMAGING | Facility: HOSPITAL | Age: 63
Discharge: HOME | End: 2023-09-01
Attending: PHYSICIAN ASSISTANT
Payer: COMMERCIAL

## 2023-09-01 ENCOUNTER — HOSPITAL ENCOUNTER (OUTPATIENT)
Dept: MAMMOGRAPHY | Facility: OTHER | Age: 63
Discharge: HOME | End: 2023-09-01
Attending: PHYSICIAN ASSISTANT
Payer: COMMERCIAL

## 2023-09-01 DIAGNOSIS — R92.8 ABNORMAL MAMMOGRAM: ICD-10-CM

## 2023-09-01 PROCEDURE — 76642 ULTRASOUND BREAST LIMITED: CPT | Mod: RT

## 2023-09-01 PROCEDURE — G0279 TOMOSYNTHESIS, MAMMO: HCPCS | Mod: TC | Performed by: RADIOLOGY

## 2023-09-01 PROCEDURE — 77065 DX MAMMO INCL CAD UNI: CPT | Mod: TC | Performed by: RADIOLOGY

## 2023-09-01 NOTE — LETTER
Annamaria Ercl  [address]  [place] [address]          September 1, 2023  Date of Exam: 9/1/23      Dear Annamaria:    Thank you for your recent visit.    Breast Imaging Result: Based on your recent breast imaging, you have a suspicious area that usually requires a biopsy, at which time a small tissue sample would be taken from your breast.      Breast Density: Your breast imaging shows that you have dense breast tissue. This means you have a slightly higher risk of getting breast cancer. It also means your breast imaging will be harder to read, but it doesn't mean that breast imaging isn't useful. In fact, yearly breast imaging is even more important for individuals at higher risk. Additional testing may be warranted depending on your overall risk.    If you have already made these arrangements, please disregard this letter.    A report of your breast imaging results was sent to: [facility]    Your breast imaging will become part of your medical file here at [facility] for at least 10 years. You are responsible for informing any new health care team or breast imaging facility of the date and location of this examination.    We appreciate the opportunity to participate in your health care.    Sincerely,  Hilda London MD  [facility]

## 2023-09-06 ENCOUNTER — HOSPITAL ENCOUNTER (OUTPATIENT)
Facility: HOSPITAL | Age: 63
Discharge: HOME | End: 2023-09-06
Attending: RADIOLOGY | Admitting: RADIOLOGY
Payer: COMMERCIAL

## 2023-09-06 ENCOUNTER — ANCILLARY PROCEDURE (OUTPATIENT)
Dept: MAMMOGRAPHY | Facility: OTHER | Age: 63
End: 2023-09-06
Attending: RADIOLOGY
Payer: COMMERCIAL

## 2023-09-06 ENCOUNTER — HOSPITAL ENCOUNTER (OUTPATIENT)
Dept: ULTRASOUND IMAGING | Facility: HOSPITAL | Age: 63
Discharge: HOME | End: 2023-09-06
Attending: SURGERY
Payer: COMMERCIAL

## 2023-09-06 VITALS — DIASTOLIC BLOOD PRESSURE: 89 MMHG | SYSTOLIC BLOOD PRESSURE: 144 MMHG

## 2023-09-06 DIAGNOSIS — N63.11 MASS OF UPPER OUTER QUADRANT OF RIGHT BREAST: ICD-10-CM

## 2023-09-06 PROCEDURE — 19083 BX BREAST 1ST LESION US IMAG: CPT | Mod: RT

## 2023-09-06 PROCEDURE — 88305 TISSUE EXAM BY PATHOLOGIST: CPT | Mod: TC | Performed by: SURGERY

## 2023-09-06 PROCEDURE — 250N000009 HC RX 250: Performed by: RADIOLOGY

## 2023-09-06 PROCEDURE — 88305 TISSUE EXAM BY PATHOLOGIST: CPT | Mod: 26 | Performed by: PATHOLOGY

## 2023-09-06 RX ORDER — ABATACEPT 125 MG/ML
125 INJECTION, SOLUTION SUBCUTANEOUS WEEKLY
COMMUNITY

## 2023-09-06 RX ORDER — HYDROXYCHLOROQUINE SULFATE 100 MG/1
TABLET ORAL
COMMUNITY

## 2023-09-06 RX ORDER — LIDOCAINE HYDROCHLORIDE 10 MG/ML
5-10 INJECTION, SOLUTION EPIDURAL; INFILTRATION; INTRACAUDAL; PERINEURAL
Status: COMPLETED | OUTPATIENT
Start: 2023-09-06 | End: 2023-09-06

## 2023-09-06 RX ADMIN — LIDOCAINE HYDROCHLORIDE 8 ML: 10 INJECTION, SOLUTION EPIDURAL; INFILTRATION; INTRACAUDAL; PERINEURAL at 10:41

## 2023-09-06 RX ADMIN — LIDOCAINE HYDROCHLORIDE AND EPINEPHRINE 6 ML: 20; 10 INJECTION, SOLUTION INFILTRATION; PERINEURAL at 10:42

## 2023-09-06 ASSESSMENT — ACTIVITIES OF DAILY LIVING (ADL): ADLS_ACUITY_SCORE: 35

## 2023-09-06 NOTE — PROGRESS NOTES
Patient here for ultrasound guided biopsy of right breast.  Procedure reviewed with patient by writer and radiologist, questions answered.  Time out performed prior to biopsy.  Biopsy completed by radiologist, clip placed.  Pressure held to biopsy site for 10 minutes.  Medipore dressing and steri strips  applied.   Post clip mammogram completed.  Patient had bruising forming, radiologist assessed area and determined that more pressure needed to be held to site. pressure for another 10 minutes was held. radiologist assessed area post pressure, stated patient was okay to leave. Ice packs and gauze given to patient.  Discharge instructions reviewed with patient, patient verbalizes understanding of instructions.  Discharged to home in stable condition with no evidence of bleeding from biopsy site.       Jamee NEGRON RN

## 2023-09-06 NOTE — DISCHARGE INSTRUCTIONS
"NEEDLE BIOPSY BREAST    Activity: Rest the remainder of the day. You may resume normal activity after the next day. Avoid any vigorous/strenuous physical activity for 24 hours.    Comfort: If you have discomfort or tenderness at the site you may take your usual or recommended pain medication. Do not take aspirin the day of the procedure or for 48 hours following the biopsy.    Diet: You may resume your usual diet.    Care of site: Leave ice pack in place for 4 hours, or until it is no longer cold. The ice pack is reusable and may be refrozen.  Keep your bra and the dressing on for 24 hours. Then you may remove the bandage and shower. If there are steri-strips you may remove them in 3 to 5 days.  You may have some discomfort and a small amount of bruising where the biopsy was performed. This is normal. For several days or even a couple of weeks, you may have tenderness or \"twinges\" and a tiny bump where the needle went into the skin. This can be bothersome, but is not abnormal. You can use warm moist washcloths, as this may help. Do Not Use A Heating Pad.    RETURN TO THE EMERGENCY ROOM FOR:   Shortness of breath   Rapid heart rate   If pain becomes worse    Call Your Doctor For:    A fever over 101 degrees   Increased redness, increased swelling, and/or persistent drainage/discomfort  around the site    Other: At the end of your breast biopsy, a tiny titanium clip will be inserted through the biopsy needle and placed at the biopsy site within your breast. The marker provides a landmark of the biopsy for further mammograms or surgical procedures. This marker is MRI compatible and poses no known health risks.    In 6 months a mammogram will be needed to establish a new baseline and to recheck the area where the biopsy occurred. Our radiology department will call you to schedule an appointment.        "

## 2023-09-07 ENCOUNTER — TELEPHONE (OUTPATIENT)
Dept: MAMMOGRAPHY | Facility: OTHER | Age: 63
End: 2023-09-07

## 2023-09-07 PROBLEM — M05.79 RHEUMATOID ARTHRITIS INVOLVING MULTIPLE SITES WITH POSITIVE RHEUMATOID FACTOR (H): Status: ACTIVE | Noted: 2022-01-28

## 2023-09-07 PROBLEM — R22.30 MASS OF WRIST: Status: ACTIVE | Noted: 2021-09-07

## 2023-09-07 LAB
PATH REPORT.COMMENTS IMP SPEC: NORMAL
PATH REPORT.FINAL DX SPEC: NORMAL
PATH REPORT.GROSS SPEC: NORMAL
PATH REPORT.MICROSCOPIC SPEC OTHER STN: NORMAL
PHOTO IMAGE: NORMAL

## 2023-09-07 NOTE — TELEPHONE ENCOUNTER
Tried calling patient to see how she was doing post breast biopsy. Patient's  answered patient's phone and stated that the patient wouldn't be home for a few days. Told  I was just calling to see how she was doing. He stated that she was doing fine.     Jamee NEGRON RN

## 2023-09-11 ENCOUNTER — TELEPHONE (OUTPATIENT)
Dept: SURGERY | Facility: OTHER | Age: 63
End: 2023-09-11

## 2023-09-11 NOTE — TELEPHONE ENCOUNTER
Called patient to discuss her breast biopsy results. Educated patient that she will need a 6 month mammogram follow up per radiologist. Patient does want to cancel results appt with Dr. Roth.     Jamee NEGRON RN

## 2023-12-08 DIAGNOSIS — K27.9 PUD (PEPTIC ULCER DISEASE): ICD-10-CM

## 2023-12-08 NOTE — TELEPHONE ENCOUNTER
omeprazole (PRILOSEC) 20 MG DR capsule       Last Written Prescription Date:  6/12/23  Last Fill Quantity: 90,   # refills: 1  Last Office Visit: 6/27/22  Future Office visit:

## 2024-03-11 ENCOUNTER — HOSPITAL ENCOUNTER (OUTPATIENT)
Dept: MAMMOGRAPHY | Facility: OTHER | Age: 64
Discharge: HOME | End: 2024-03-11
Attending: PHYSICIAN ASSISTANT | Admitting: PHYSICIAN ASSISTANT
Payer: COMMERCIAL

## 2024-03-11 DIAGNOSIS — Z09 FOLLOW-UP EXAM, 3-6 MONTHS SINCE PREVIOUS EXAM: ICD-10-CM

## 2024-03-11 PROBLEM — N63.0 BREAST MASS: Status: ACTIVE | Noted: 2018-09-20

## 2024-03-11 PROCEDURE — 77065 DX MAMMO INCL CAD UNI: CPT | Mod: TC,RT

## 2024-06-04 DIAGNOSIS — K27.9 PUD (PEPTIC ULCER DISEASE): ICD-10-CM

## 2024-06-04 NOTE — TELEPHONE ENCOUNTER
PPI Protocol [id number]/04/2024 12:48 AM   Protocol Details Recent (12 mo) or future (90 days) visit within the authorizing provider's specialty

## 2024-06-04 NOTE — TELEPHONE ENCOUNTER
Omeprazole      Last Written Prescription Date:  12/8/23  Last Fill Quantity: 90,   # refills: 1  Last Office Visit: 7/7/21  Future Office visit:       Routing refill request to provider for review/approval because:

## 2024-07-13 ENCOUNTER — HEALTH MAINTENANCE LETTER (OUTPATIENT)
Age: 64
End: 2024-07-13

## 2024-08-23 ENCOUNTER — TELEPHONE (OUTPATIENT)
Dept: MAMMOGRAPHY | Facility: OTHER | Age: 64
End: 2024-08-23

## 2024-08-23 ENCOUNTER — ANCILLARY PROCEDURE (OUTPATIENT)
Dept: MAMMOGRAPHY | Facility: OTHER | Age: 64
End: 2024-08-23
Attending: PHYSICIAN ASSISTANT
Payer: COMMERCIAL

## 2024-08-23 DIAGNOSIS — Z12.31 VISIT FOR SCREENING MAMMOGRAM: ICD-10-CM

## 2024-08-23 PROCEDURE — 77063 BREAST TOMOSYNTHESIS BI: CPT | Mod: TC

## 2024-12-31 DIAGNOSIS — K27.9 PUD (PEPTIC ULCER DISEASE): ICD-10-CM

## 2025-05-26 DIAGNOSIS — K27.9 PUD (PEPTIC ULCER DISEASE): ICD-10-CM

## 2025-05-28 RX ORDER — OMEPRAZOLE 20 MG/1
20 CAPSULE, DELAYED RELEASE ORAL DAILY
Qty: 90 CAPSULE | Refills: 1 | OUTPATIENT
Start: 2025-05-28

## 2025-05-28 NOTE — TELEPHONE ENCOUNTER
OMEPRAZOLE DR 20 MG CAPSULE       Last Written Prescription Date:  12/31/2024  Last Fill Quantity: 90,   # refills: 1  Last Office Visit: 07/07/2021  Future Office visit:       Routing refill request to provider for review/approval because:    PPI Protocol [id number]/26/2025 12:27 AM   Protocol Details Recent (12 month) or future (90 days) visit with authorizing provider's specialty (provided they have been seen in the past 15 months)        Kimberly Boecker, RN

## 2025-06-30 DIAGNOSIS — K27.9 PUD (PEPTIC ULCER DISEASE): ICD-10-CM

## 2025-06-30 RX ORDER — OMEPRAZOLE 20 MG/1
20 CAPSULE, DELAYED RELEASE ORAL DAILY
Qty: 90 CAPSULE | Refills: 1 | OUTPATIENT
Start: 2025-06-30

## 2025-06-30 NOTE — TELEPHONE ENCOUNTER
OMEPRAZOLE DR 20 MG CAPSULE       Last Written Prescription Date:  12/31/2024  Last Fill Quantity: 90,   # refills: 1  Last Office Visit: ***  Future Office visit:       Routing refill request to provider for review/approval because:    PPI Protocol [id number]/30/2025 11:25 AM   Protocol Details Recent (12 month) or future (90 days) visit with authorizing provider's specialty (provided they have been seen in the past 15 months)          Kimberly Boecker, RN

## 2025-07-15 DIAGNOSIS — K27.9 PUD (PEPTIC ULCER DISEASE): ICD-10-CM

## 2025-07-15 RX ORDER — OMEPRAZOLE 20 MG/1
20 CAPSULE, DELAYED RELEASE ORAL DAILY
Qty: 90 CAPSULE | Refills: 1 | OUTPATIENT
Start: 2025-07-15

## 2025-07-15 NOTE — TELEPHONE ENCOUNTER
OMEPRAZOLE DR 20 MG CAPSULE       Last Written Prescription Date:  12/31/2024  Last Fill Quantity: 90,   # refills: 1  Last Office Visit: 07/07/2021  Future Office visit:       Routing refill request to provider for review/approval because:    PPI Protocol Bwzaft22/15/2025 11:56 AM   Protocol Details Recent (12 month) or future (90 days) visit with authorizing provider's specialty (provided they have been seen in the past 15 months)        Kimberly Boecker, RN

## 2025-07-15 NOTE — TELEPHONE ENCOUNTER
Attempt # 1  Outcome: Talked with Patient    Comment: Pt has new provider: [facility]. Asked patient to have pharmacy send refills to [facility] now

## 2025-07-19 ENCOUNTER — HEALTH MAINTENANCE LETTER (OUTPATIENT)
Age: 65
End: 2025-07-19